# Patient Record
Sex: MALE | Race: WHITE | Employment: FULL TIME | URBAN - METROPOLITAN AREA
[De-identification: names, ages, dates, MRNs, and addresses within clinical notes are randomized per-mention and may not be internally consistent; named-entity substitution may affect disease eponyms.]

---

## 2017-02-13 ENCOUNTER — TELEPHONE (OUTPATIENT)
Dept: FAMILY MEDICINE CLINIC | Age: 52
End: 2017-02-13

## 2017-02-13 NOTE — TELEPHONE ENCOUNTER
Patient called and stated that he needs to have labs done for his diabetes, but none was ordered in chart. Please advise.

## 2017-02-17 ENCOUNTER — OFFICE VISIT (OUTPATIENT)
Dept: FAMILY MEDICINE CLINIC | Age: 52
End: 2017-02-17

## 2017-02-17 VITALS
BODY MASS INDEX: 28.57 KG/M2 | WEIGHT: 177.75 LBS | RESPIRATION RATE: 20 BRPM | HEIGHT: 66 IN | SYSTOLIC BLOOD PRESSURE: 138 MMHG | DIASTOLIC BLOOD PRESSURE: 83 MMHG | HEART RATE: 91 BPM | TEMPERATURE: 97.8 F

## 2017-02-17 DIAGNOSIS — R50.9 FEVER AND CHILLS: ICD-10-CM

## 2017-02-17 DIAGNOSIS — R68.89 FLU-LIKE SYMPTOMS: Primary | ICD-10-CM

## 2017-02-17 DIAGNOSIS — Z51.81 MEDICATION MONITORING ENCOUNTER: ICD-10-CM

## 2017-02-17 DIAGNOSIS — J44.9 CHRONIC OBSTRUCTIVE PULMONARY DISEASE, UNSPECIFIED COPD TYPE (HCC): ICD-10-CM

## 2017-02-17 DIAGNOSIS — E11.9 TYPE 2 DIABETES MELLITUS WITHOUT COMPLICATION, WITHOUT LONG-TERM CURRENT USE OF INSULIN (HCC): ICD-10-CM

## 2017-02-17 DIAGNOSIS — R25.2 MUSCLE CRAMPING: ICD-10-CM

## 2017-02-17 DIAGNOSIS — E55.9 VITAMIN D DEFICIENCY: ICD-10-CM

## 2017-02-17 DIAGNOSIS — R52 BODY ACHES: ICD-10-CM

## 2017-02-17 DIAGNOSIS — R05.9 COUGH: ICD-10-CM

## 2017-02-17 DIAGNOSIS — E78.5 HYPERLIPIDEMIA, UNSPECIFIED HYPERLIPIDEMIA TYPE: ICD-10-CM

## 2017-02-17 LAB
QUICKVUE INFLUENZA TEST: NEGATIVE
VALID INTERNAL CONTROL?: YES

## 2017-02-17 RX ORDER — METHOCARBAMOL 500 MG/1
TABLET, FILM COATED ORAL
Qty: 30 TAB | Refills: 2 | Status: SHIPPED | OUTPATIENT
Start: 2017-02-17 | End: 2017-09-26

## 2017-02-17 RX ORDER — OSELTAMIVIR PHOSPHATE 75 MG/1
75 CAPSULE ORAL 2 TIMES DAILY
Qty: 10 CAP | Refills: 0 | Status: SHIPPED | OUTPATIENT
Start: 2017-02-17 | End: 2017-02-22

## 2017-02-17 NOTE — MR AVS SNAPSHOT
Visit Information Date & Time Provider Department Dept. Phone Encounter #  
 2/17/2017  2:45 PM Demetrius Levy MD [address] [phone] [id number] Follow-up Instructions Return in about 1 month (around 3/17/2017). Upcoming Health Maintenance Date Due  
 EYE EXAM RETINAL OR DILATED Q1 2/9/2016 HEMOGLOBIN A1C Q6M 5/5/2017 FOOT EXAM Q1 5/26/2017 MICROALBUMIN Q1 5/26/2017 FOBT Q 1 YEAR AGE 50-75 5/26/2017 LIPID PANEL Q1 11/5/2017 DTaP/Tdap/Td series (2 - Td) 2/26/2026 Allergies as of 2/17/2017  Review Complete On: 2/17/2017 By: Demetrius Levy MD  
  
 Severity Noted Reaction Type Reactions Lisinopril  12/20/2011    Cough Pravastatin  10/24/2013    Itching Current Immunizations  Never Reviewed Name Date Influenza Vaccine 11/4/2013 Pneumococcal Polysaccharide (PPSV-23) 8/26/2016 Tdap 2/26/2016 Not reviewed this visit You Were Diagnosed With   
  
 Codes Comments Flu-like symptoms    -  Primary ICD-10-CM: R68.89 ICD-9-CM: 780.99 Fever and chills     ICD-10-CM: R50.9 ICD-9-CM: 780.60 Cough     ICD-10-CM: R05 ICD-9-CM: 786.2 Body aches     ICD-10-CM: R52 ICD-9-CM: 780.96 Muscle cramping     ICD-10-CM: R25.2 ICD-9-CM: 729.82 Chronic obstructive pulmonary disease, unspecified COPD type ([facility]     ICD-10-CM: J44.9 ICD-9-CM: 384 Hyperlipidemia, unspecified hyperlipidemia type     ICD-10-CM: E78.5 ICD-9-CM: 272.4 Vitamin D deficiency     ICD-10-CM: E55.9 ICD-9-CM: 268.9 Type 2 diabetes mellitus without complication, without long-term current use of insulin (HCC)     ICD-10-CM: E11.9 ICD-9-CM: 250.00 Medication monitoring encounter     ICD-10-CM: Z51.81 
ICD-9-CM: V58.83 Vitals BP Pulse Temp Resp Height(growth percentile) Weight(growth percentile)  138/83 (BP 1 Location: Right arm, BP Patient Position: Sitting) 91 97.8 °F (36.6 °C) (Oral) 20 5' 6\" (1.676 m) 177 lb 12 oz (80.6 kg) BMI Smoking Status 28.69 kg/m2 Current Every Day Smoker BMI and BSA Data Body Mass Index Body Surface Area  
 28.69 kg/m 2 1.94 m 2 Preferred Pharmacy Pharmacy Name Phone Melany Farrell [address], [address] [address] [phone] Your Updated Medication List  
  
   
This list is accurate as of: 2/17/17  4:12 PM.  Always use your most recent med list.  
  
  
  
  
 albuterol 90 mcg/actuation inhaler Commonly known as:  PROVENTIL HFA, VENTOLIN HFA, PROAIR HFA Take 2 Puffs by inhalation every six (6) hours as needed for Wheezing. aspirin 81 mg chewable tablet Take 81 mg by mouth daily. ergocalciferol 50,000 unit capsule Commonly known as:  VITAMIN D2  
TAKE 1 CAPSULE BY MOUTH EVERY 7 DAYS  
  
 fenofibrate nanocrystallized 145 mg tablet Commonly known as:  TRICOR  
TAKE 1 TABLET BY MOUTH EVERY DAY  
  
 losartan 25 mg tablet Commonly known as:  COZAAR  
TAKE 1 TABLET BY MOUTH EVERY DAY  
  
 methocarbamol 500 mg tablet Commonly known as:  ROBAXIN  
TAKE 1 TABLET BY MOUTH EVERY NIGHT AS NEEDED  
  
 naproxen 500 mg tablet Commonly known as:  NAPROSYN Take 500 mg by mouth two (2) times daily (with meals). oseltamivir 75 mg capsule Commonly known as:  TAMIFLU Take 1 Cap by mouth two (2) times a day for 5 days. SITagliptin-metFORMIN 50-1,000 mg per tablet Commonly known as:  JANUMET  
TAKE 1 TABLET BY MOUTH TWICE DAILY WITH MEALS  
  
 tiotropium bromide 1.25 mcg/actuation inhaler Commonly known as:  Perlita Ka Take 2 Puffs by inhalation daily. ZyrTEC 10 mg tablet Generic drug:  cetirizine Take 10 mg by mouth daily. Prescriptions Sent to Pharmacy Refills  
 methocarbamol (ROBAXIN) 500 mg tablet 2  Sig: TAKE 1 TABLET BY MOUTH EVERY NIGHT AS NEEDED  
 Class: Normal  
 Pharmacy: [facility] [address] [place] [address] Ph #: [phone]  
 oseltamivir (TAMIFLU) 75 mg capsule 0 Sig: Take 1 Cap by mouth two (2) times a day for 5 days. Class: Normal  
 Pharmacy: [facility] [address], [address] [place] [address] Ph #: [phone] Route: Oral  
  
We Performed the Following AMB POC RAPID INFLUENZA TEST [75221 CPT(R)] Follow-up Instructions Return in about 1 month (around 3/17/2017). To-Do List   
 03/17/2017 Lab:  CK   
  
 03/17/2017 Lab:  HEMOGLOBIN A1C W/O EAG   
  
 03/17/2017 Lab:  MAGNESIUM   
  
 03/17/2017 Lab:  METABOLIC PANEL, COMPREHENSIVE   
  
 03/17/2017 Lab:  VITAMIN B12 Patient Instructions Please contact our office if you have any questions about your visit today. Introducing [facility]! Dear Doreen Neri: Thank you for requesting a 6APT account. Our records indicate that you already have an active 6APT account. You can access your account anytime at [email or web address] Did you know that you can access your hospital and ER discharge instructions at any time in 6APT? You can also review all of your test results from your hospital stay or ER visit. Additional Information If you have questions, please visit the Frequently Asked Questions section of the 6APT website at [email or web address] Remember, 6APT is NOT to be used for urgent needs. For medical emergencies, dial 911. Now available from your iPhone and Android! Please provide this summary of care documentation to your next provider. Your primary care clinician is listed as GARDENIA FREED. If you have any questions after today's visit, please call [phone].

## 2017-02-17 NOTE — PROGRESS NOTES
Chief Complaint   Patient presents with    COPD    Diabetes    Cholesterol Problem     high chol    Vitamin D Deficiency    Allergic Rhinitis       Health Maintenance reviewed     1. Have you been to the ER, urgent care clinic since your last visit? Hospitalized since your last visit? No    2. Have you seen or consulted any other health care providers outside of the [address] [place] [address] since your last visit? Include any pap smears or colon screening.  No

## 2017-02-17 NOTE — PROGRESS NOTES
HISTORY OF PRESENT ILLNESS  Melanie Sebastian is a 46 y.o. male. 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 1 Inhaler 6    sitaGLIPtin-metFORMIN (JANUMET) 50-1,000 mg per tablet TAKE 1 TABLET BY MOUTH TWICE DAILY WITH MEALS 60 Tab 6    ergocalciferol (VITAMIN D2) 50,000 unit capsule TAKE 1 CAPSULE BY MOUTH EVERY 7 DAYS 4 Cap 6    fenofibrate nanocrystallized (TRICOR) 145 mg tablet TAKE 1 TABLET BY MOUTH EVERY DAY 30 Tab 0    albuterol (PROVENTIL HFA, VENTOLIN HFA, PROAIR HFA) 90 mcg/actuation inhaler Take 2 Puffs by inhalation every six (6) hours as needed for Wheezing. 1 Inhaler 3    aspirin 81 mg chewable tablet Take 81 mg by mouth daily.  cetirizine (ZYRTEC) 10 mg tablet Take 10 mg by mouth daily.  losartan (COZAAR) 25 mg tablet TAKE 1 TABLET BY MOUTH EVERY DAY 30 Tab 0    naproxen (NAPROSYN) 500 mg tablet Take 500 mg by mouth two (2) times daily (with meals). Allergies   Allergen Reactions    Lisinopril Cough    Pravastatin Itching       Review of Systems   Constitutional: Positive for chills and fever.    HENT: Negative for ear pain and sore throat. Respiratory: Positive for cough and sputum production. Visit Vitals    /83 (BP 1 Location: Right arm, BP Patient Position: Sitting)    Pulse 91    Temp 97.8 °F (36.6 °C) (Oral)    Resp 20    Ht 5' 6\" (1.676 m)    Wt 177 lb 12 oz (80.6 kg)    BMI 28.69 kg/m2       Physical Exam   Constitutional: He appears well-developed and well-nourished. He appears distressed. HENT:   Right Ear: External ear normal.   Left Ear: External ear normal.   Mouth/Throat: Posterior oropharyngeal erythema present. No oropharyngeal exudate. Eyes: Conjunctivae and EOM are normal.   Cardiovascular: Normal rate, regular rhythm and normal heart sounds. Pulmonary/Chest: Effort normal and breath sounds normal. No respiratory distress. He has no wheezes. He has no rales. Lymphadenopathy:     He has cervical adenopathy. Neurological: Coordination normal.   Skin: No pallor. Nursing note and vitals reviewed. Results for orders placed or performed in visit on 02/17/17   AMB POC RAPID INFLUENZA TEST   Result Value Ref Range    VALID INTERNAL CONTROL POC Yes     QuickVue Influenza test Negative Negative    ASSESSMENT and PLAN    ICD-10-CM ICD-9-CM    1. Flu-like symptoms R68.89 780.99 oseltamivir (TAMIFLU) 75 mg capsule   2. Fever and chills R50.9 780.60 AMB POC RAPID INFLUENZA TEST   3. Cough R05 786.2 AMB POC RAPID INFLUENZA TEST   4. Body aches R52 780.96 AMB POC RAPID INFLUENZA TEST      VITAMIN B12   5. Muscle cramping R25.2 729.82 methocarbamol (ROBAXIN) 500 mg tablet      METABOLIC PANEL, COMPREHENSIVE      MAGNESIUM      VITAMIN B12      CK   6. Chronic obstructive pulmonary disease, unspecified COPD type ([facility] J44.9 496    7. Hyperlipidemia, unspecified hyperlipidemia type Z39.5 205.5 METABOLIC PANEL, COMPREHENSIVE   8. Vitamin D deficiency E55.9 268.9    9. Type 2 diabetes mellitus without complication, without long-term current use of insulin ([facility]) E11.9 250.00 HEMOGLOBIN A1C W/O EAG   10. Medication monitoring encounter B89.95 C52.62 METABOLIC PANEL, COMPREHENSIVE      MAGNESIUM      VITAMIN B12      HEMOGLOBIN A1C W/O EAG      CK   ,Follow-up Disposition:  Return in about 1 month (around 3/17/2017).

## 2017-03-17 DIAGNOSIS — E11.9 TYPE 2 DIABETES MELLITUS WITHOUT COMPLICATION, WITHOUT LONG-TERM CURRENT USE OF INSULIN (HCC): ICD-10-CM

## 2017-03-17 DIAGNOSIS — R52 BODY ACHES: ICD-10-CM

## 2017-03-17 DIAGNOSIS — R25.2 MUSCLE CRAMPING: ICD-10-CM

## 2017-03-17 DIAGNOSIS — E78.5 HYPERLIPIDEMIA, UNSPECIFIED HYPERLIPIDEMIA TYPE: ICD-10-CM

## 2017-03-17 DIAGNOSIS — Z51.81 MEDICATION MONITORING ENCOUNTER: ICD-10-CM

## 2017-03-24 ENCOUNTER — OFFICE VISIT (OUTPATIENT)
Dept: FAMILY MEDICINE CLINIC | Age: 52
End: 2017-03-24

## 2017-03-24 VITALS
RESPIRATION RATE: 16 BRPM | WEIGHT: 178.25 LBS | BODY MASS INDEX: 28.65 KG/M2 | SYSTOLIC BLOOD PRESSURE: 130 MMHG | TEMPERATURE: 97.4 F | HEART RATE: 94 BPM | DIASTOLIC BLOOD PRESSURE: 81 MMHG | HEIGHT: 66 IN

## 2017-03-24 DIAGNOSIS — E11.9 TYPE 2 DIABETES MELLITUS WITHOUT COMPLICATION, WITHOUT LONG-TERM CURRENT USE OF INSULIN (HCC): Primary | ICD-10-CM

## 2017-03-24 DIAGNOSIS — E55.9 VITAMIN D DEFICIENCY: ICD-10-CM

## 2017-03-24 DIAGNOSIS — E78.5 HYPERLIPIDEMIA, UNSPECIFIED HYPERLIPIDEMIA TYPE: ICD-10-CM

## 2017-03-24 DIAGNOSIS — Z51.81 MEDICATION MONITORING ENCOUNTER: ICD-10-CM

## 2017-03-24 NOTE — MR AVS SNAPSHOT
Visit Information Date & Time Provider Department Dept. Phone Encounter #  
 3/24/2017  2:45 PM Audrey Figueroa MD [address] [id number] Follow-up Instructions Return in about 3 months (around 6/24/2017). Upcoming Health Maintenance Date Due  
 EYE EXAM RETINAL OR DILATED Q1 2/9/2016 FOOT EXAM Q1 5/26/2017 MICROALBUMIN Q1 5/26/2017 FOBT Q 1 YEAR AGE 50-75 5/26/2017 HEMOGLOBIN A1C Q6M 9/2/2017 LIPID PANEL Q1 11/5/2017 DTaP/Tdap/Td series (2 - Td) 2/26/2026 Allergies as of 3/24/2017  Review Complete On: 3/24/2017 By: Audrey Figueroa MD  
  
 Severity Noted Reaction Type Reactions Lisinopril  12/20/2011    Cough Pravastatin  10/24/2013    Itching Current Immunizations  Reviewed on 3/24/2017 Name Date Influenza Vaccine 11/4/2013 Pneumococcal Polysaccharide (PPSV-23) 8/26/2016 Tdap 2/26/2016 Reviewed by Audrey Figueroa MD on 3/24/2017 at  3:40 PM  
You Were Diagnosed With   
  
 Codes Comments Type 2 diabetes mellitus without complication, without long-term current use of insulin (HCC)    -  Primary ICD-10-CM: E11.9 ICD-9-CM: 250.00 Hyperlipidemia, unspecified hyperlipidemia type     ICD-10-CM: E78.5 ICD-9-CM: 272.4 Vitamin D deficiency     ICD-10-CM: E55.9 ICD-9-CM: 268.9 Medication monitoring encounter     ICD-10-CM: Z51.81 
ICD-9-CM: V58.83 Vitals BP Pulse Temp Resp Height(growth percentile) Weight(growth percentile) 130/81 (BP 1 Location: Right arm, BP Patient Position: Sitting) 94 97.4 °F (36.3 °C) (Oral) 16 5' 6\" (1.676 m) 178 lb 4 oz (80.9 kg) BMI Smoking Status 28.77 kg/m2 Current Every Day Smoker BMI and BSA Data Body Mass Index Body Surface Area 28.77 kg/m 2 1.94 m 2 Preferred Pharmacy Pharmacy Name Phone  [facility] [address] [facility] [address]uc[address] [phone] Your Updated Medication List  
  
   
This list is accurate as of: 3/24/17  3:49 PM.  Always use your most recent med list.  
  
  
  
  
 albuterol 90 mcg/actuation inhaler Commonly known as:  PROVENTIL HFA, VENTOLIN HFA, PROAIR HFA Take 2 Puffs by inhalation every six (6) hours as needed for Wheezing. aspirin 81 mg chewable tablet Take 81 mg by mouth daily. ergocalciferol 50,000 unit capsule Commonly known as:  VITAMIN D2  
TAKE 1 CAPSULE BY MOUTH EVERY 7 DAYS  
  
 fenofibrate nanocrystallized 145 mg tablet Commonly known as:  TRICOR  
TAKE 1 TABLET BY MOUTH EVERY DAY  
  
 losartan 25 mg tablet Commonly known as:  COZAAR  
TAKE 1 TABLET BY MOUTH EVERY DAY  
  
 methocarbamol 500 mg tablet Commonly known as:  ROBAXIN  
TAKE 1 TABLET BY MOUTH EVERY NIGHT AS NEEDED  
  
 naproxen 500 mg tablet Commonly known as:  NAPROSYN Take 500 mg by mouth two (2) times daily (with meals). SITagliptin-metFORMIN 50-1,000 mg per tablet Commonly known as:  JANUMET  
TAKE 1 TABLET BY MOUTH TWICE DAILY WITH MEALS  
  
 tiotropium bromide 1.25 mcg/actuation inhaler Commonly known as:  Robertha Raja Take 2 Puffs by inhalation daily. ZyrTEC 10 mg tablet Generic drug:  cetirizine Take 10 mg by mouth daily. Follow-up Instructions Return in about 3 months (around 6/24/2017). To-Do List   
 06/24/2017 Lab:  CBC WITH AUTOMATED DIFF   
  
 06/24/2017 Lab:  HEMOGLOBIN A1C W/O EAG   
  
 06/24/2017 Lab:  LIPID PANEL   
  
 06/24/2017 Lab:  MAGNESIUM   
  
 06/24/2017 Lab:  METABOLIC PANEL, COMPREHENSIVE   
  
 06/24/2017 Lab:  MICROALBUMIN, UR, RAND W/ MICROALBUMIN/CREA RATIO   
  
 06/24/2017 Lab:  TSH 3RD GENERATION   
  
 06/24/2017 Lab:  VITAMIN B12   
  
 06/24/2017 Lab:  VITAMIN D, 25 HYDROXY Patient Instructions Please contact our office if you have any questions about your visit today. Introducing [address]! Dear Deysi Jeff: Thank you for requesting a Parkt account. Our records indicate that you already have an active Parkt account. You can access your account anytime at [email or web address] Did you know that you can access your hospital and ER discharge instructions at any time in Parkt? You can also review all of your test results from your hospital stay or ER visit. Additional Information If you have questions, please visit the Frequently Asked Questions section of the Parkt website at [email or web address] Remember, Parkt is NOT to be used for urgent needs. For medical emergencies, dial 911. Now available from your iPhone and Android! Please provide this summary of care documentation to your next provider. Your primary care clinician is listed as GARDENIA FREED. If you have any questions after today's visit, please call [phone].

## 2017-03-24 NOTE — PROGRESS NOTES
Chief Complaint   Patient presents with    COPD    Diabetes    Vitamin D Deficiency    Cholesterol Problem     high chol    Allergic Rhinitis    Results     discuss lab results       Health Maintenance reviewed     1. Have you been to the ER, urgent care clinic since your last visit? Hospitalized since your last visit? No    2. Have you seen or consulted any other health care providers outside of the [address] [place] [address] since your last visit? Include any pap smears or colon screening.  No

## 2017-03-24 NOTE — PROGRESS NOTES
HISTORY OF PRESENT ILLNESS  Malu Green is a 46 y.o. male. Chief Complaint   Patient presents with    COPD    Diabetes chronic problem, stable mildly uncontrolled     Vitamin D Deficiency    Cholesterol Problem chronic problem, stable       high chol    Allergic Rhinitis    Results     discuss lab results       HPI  Past Medical History:   Diagnosis Date    Hyperlipidemia     Type II or unspecified type diabetes mellitus without mention of complication, not stated as uncontrolled     Vitamin D deficiency      Current Outpatient Prescriptions   Medication Sig Dispense Refill    methocarbamol (ROBAXIN) 500 mg tablet TAKE 1 TABLET BY MOUTH EVERY NIGHT AS NEEDED 30 Tab 2    tiotropium bromide (SPIRIVA RESPIMAT) 1.25 mcg/actuation inhaler Take 2 Puffs by inhalation daily. 1 Inhaler 6    sitaGLIPtin-metFORMIN (JANUMET) 50-1,000 mg per tablet TAKE 1 TABLET BY MOUTH TWICE DAILY WITH MEALS 60 Tab 6    ergocalciferol (VITAMIN D2) 50,000 unit capsule TAKE 1 CAPSULE BY MOUTH EVERY 7 DAYS 4 Cap 6    losartan (COZAAR) 25 mg tablet TAKE 1 TABLET BY MOUTH EVERY DAY 30 Tab 0    fenofibrate nanocrystallized (TRICOR) 145 mg tablet TAKE 1 TABLET BY MOUTH EVERY DAY 30 Tab 0    naproxen (NAPROSYN) 500 mg tablet Take 500 mg by mouth two (2) times daily (with meals).  albuterol (PROVENTIL HFA, VENTOLIN HFA, PROAIR HFA) 90 mcg/actuation inhaler Take 2 Puffs by inhalation every six (6) hours as needed for Wheezing. 1 Inhaler 3    aspirin 81 mg chewable tablet Take 81 mg by mouth daily.  cetirizine (ZYRTEC) 10 mg tablet Take 10 mg by mouth daily. Allergies   Allergen Reactions    Lisinopril Cough    Pravastatin Itching       ROS Respiratory: Negative for shortness of breath. Cardiovascular: Negative for chest pain. Genitourinary: Negative for frequency. Neurological: Negative for dizziness and headaches.     Visit Vitals    /81 (BP 1 Location: Right arm, BP Patient Position: Sitting)    Pulse 94    Temp 97.4 °F (36.3 °C) (Oral)    Resp 16    Ht 5' 6\" (1.676 m)    Wt 178 lb 4 oz (80.9 kg)    BMI 28.77 kg/m2       Physical Exam Nursing note and vitals reviewed. Constitutional: He is oriented to person, place, and time. He appears well-developed and well-nourished. No distress. HENT:   Mouth/Throat: Oropharynx is clear and moist.   Neck: No JVD present. No thyromegaly present. Cardiovascular: Normal rate, regular rhythm and normal heart sounds. Pulmonary/Chest: Effort normal and breath sounds normal. No respiratory distress. He has no wheezes. He has no rales. Abdominal: Soft. Bowel sounds are normal. He exhibits no distension. There is no tenderness. There is no rebound. Musculoskeletal: He exhibits no edema and no tenderness. Lymphadenopathy:     He has no cervical adenopathy. Neurological: He is alert and oriented to person, place, and time. Skin: No pallor. Psychiatric: He has a normal mood and affect. His behavior is normal.     Component      Latest Ref Rng & Units 3/2/2017   Sodium      136 - 145 mEq/L 135 (L)   Potassium      3.5 - 5.1 mEq/L 4.0   Chloride      98 - 107 mEq/L 100   CO2      21 - 32 mEq/L 25   Glucose      74 - 106 mg/dl 215 (H)   BUN      7 - 25 mg/dl 10   Creatinine      0.6 - 1.3 mg/dl 1.0   GFR est AA       >60   GFR est non-AA       >60   Calcium      8.5 - 10.1 mg/dl 9.1   AST      15 - 37 U/L 10 (L)   ALT      12 - 78 U/L 20   Alk. phosphatase      45 - 117 U/L 62   Bilirubin, total      0.2 - 1.0 mg/dl 0.6   Protein, total      6.4 - 8.2 gm/dl 7.5   Albumin      3.4 - 5.0 gm/dl 3.8   Magnesium      1.6 - 2.6 mg/dl 1.8   Vitamin B12      193 - 986 pg/ml 335   Hemoglobin A1c, (calculated)      4.8 - 6.0 % 7.2 (H)   CK      39 - 308 U/L 120     ASSESSMENT and PLAN    ICD-10-CM ICD-9-CM    1.  Type 2 diabetes mellitus without complication, without long-term current use of insulin (HCC) L71.1 522.91 METABOLIC PANEL, COMPREHENSIVE      HEMOGLOBIN A1C W/O EAG      TSH 3RD GENERATION      MAGNESIUM      VITAMIN B12      MICROALBUMIN, UR, RAND W/ MICROALBUMIN/CREA RATIO      CBC WITH AUTOMATED DIFF   2. Hyperlipidemia, unspecified hyperlipidemia type E78.5 272.4 LIPID PANEL      METABOLIC PANEL, COMPREHENSIVE      TSH 3RD GENERATION      MAGNESIUM      VITAMIN B12      CBC WITH AUTOMATED DIFF   3. Vitamin D deficiency U13.0 433.9 METABOLIC PANEL, COMPREHENSIVE      TSH 3RD GENERATION      MAGNESIUM      VITAMIN D, 25 HYDROXY      VITAMIN B12      CBC WITH AUTOMATED DIFF   4.  Medication monitoring encounter Z51.81 V58.83 LIPID PANEL      METABOLIC PANEL, COMPREHENSIVE      HEMOGLOBIN A1C W/O EAG      TSH 3RD GENERATION      MAGNESIUM      VITAMIN D, 25 HYDROXY      VITAMIN B12      MICROALBUMIN, UR, RAND W/ MICROALBUMIN/CREA RATIO      CBC WITH AUTOMATED DIFF

## 2017-03-25 DIAGNOSIS — E55.9 VITAMIN D DEFICIENCY: ICD-10-CM

## 2017-03-27 DIAGNOSIS — E78.5 HYPERLIPIDEMIA, UNSPECIFIED HYPERLIPIDEMIA TYPE: ICD-10-CM

## 2017-03-27 RX ORDER — ERGOCALCIFEROL 1.25 MG/1
CAPSULE ORAL
Qty: 4 CAP | Refills: 0 | Status: SHIPPED | OUTPATIENT
Start: 2017-03-27 | End: 2017-04-27 | Stop reason: SDUPTHER

## 2017-03-27 RX ORDER — FENOFIBRATE 145 MG/1
TABLET, COATED ORAL
Qty: 30 TAB | Refills: 0 | Status: SHIPPED | OUTPATIENT
Start: 2017-03-27 | End: 2017-04-29 | Stop reason: SDUPTHER

## 2017-04-27 DIAGNOSIS — E55.9 VITAMIN D DEFICIENCY: ICD-10-CM

## 2017-04-27 RX ORDER — ERGOCALCIFEROL 1.25 MG/1
CAPSULE ORAL
Qty: 4 CAP | Refills: 0 | Status: SHIPPED | OUTPATIENT
Start: 2017-04-27 | End: 2017-05-27 | Stop reason: SDUPTHER

## 2017-04-29 DIAGNOSIS — E78.5 HYPERLIPIDEMIA, UNSPECIFIED HYPERLIPIDEMIA TYPE: ICD-10-CM

## 2017-05-01 RX ORDER — FENOFIBRATE 145 MG/1
TABLET, COATED ORAL
Qty: 30 TAB | Refills: 0 | Status: SHIPPED | OUTPATIENT
Start: 2017-05-01 | End: 2017-05-29 | Stop reason: SDUPTHER

## 2017-05-27 DIAGNOSIS — E55.9 VITAMIN D DEFICIENCY: ICD-10-CM

## 2017-05-29 DIAGNOSIS — E78.5 HYPERLIPIDEMIA, UNSPECIFIED HYPERLIPIDEMIA TYPE: ICD-10-CM

## 2017-05-30 RX ORDER — FENOFIBRATE 145 MG/1
TABLET, COATED ORAL
Qty: 30 TAB | Refills: 0 | Status: SHIPPED | OUTPATIENT
Start: 2017-05-30 | End: 2017-06-26 | Stop reason: SDUPTHER

## 2017-05-30 RX ORDER — ERGOCALCIFEROL 1.25 MG/1
CAPSULE ORAL
Qty: 4 CAP | Refills: 0 | Status: SHIPPED | OUTPATIENT
Start: 2017-05-30 | End: 2017-06-26 | Stop reason: SDUPTHER

## 2017-06-24 DIAGNOSIS — Z51.81 MEDICATION MONITORING ENCOUNTER: ICD-10-CM

## 2017-06-24 DIAGNOSIS — E11.9 TYPE 2 DIABETES MELLITUS WITHOUT COMPLICATION, WITHOUT LONG-TERM CURRENT USE OF INSULIN (HCC): ICD-10-CM

## 2017-06-24 DIAGNOSIS — E78.5 HYPERLIPIDEMIA, UNSPECIFIED HYPERLIPIDEMIA TYPE: ICD-10-CM

## 2017-06-24 DIAGNOSIS — E55.9 VITAMIN D DEFICIENCY: ICD-10-CM

## 2017-06-26 ENCOUNTER — OFFICE VISIT (OUTPATIENT)
Dept: FAMILY MEDICINE CLINIC | Age: 52
End: 2017-06-26

## 2017-06-26 VITALS
TEMPERATURE: 98.2 F | RESPIRATION RATE: 20 BRPM | SYSTOLIC BLOOD PRESSURE: 119 MMHG | HEIGHT: 66 IN | WEIGHT: 177.5 LBS | BODY MASS INDEX: 28.53 KG/M2 | DIASTOLIC BLOOD PRESSURE: 78 MMHG | HEART RATE: 100 BPM

## 2017-06-26 DIAGNOSIS — Z51.81 MEDICATION MONITORING ENCOUNTER: ICD-10-CM

## 2017-06-26 DIAGNOSIS — J84.10 PULMONARY INTERSTITIAL FIBROSIS (HCC): ICD-10-CM

## 2017-06-26 DIAGNOSIS — J44.0 CHRONIC OBSTRUCTIVE PULMONARY DISEASE WITH ACUTE LOWER RESPIRATORY INFECTION (HCC): ICD-10-CM

## 2017-06-26 DIAGNOSIS — E78.5 HYPERLIPIDEMIA, UNSPECIFIED HYPERLIPIDEMIA TYPE: ICD-10-CM

## 2017-06-26 DIAGNOSIS — S69.92XS HAND INJURY, LEFT, SEQUELA: ICD-10-CM

## 2017-06-26 DIAGNOSIS — E55.9 VITAMIN D DEFICIENCY: ICD-10-CM

## 2017-06-26 RX ORDER — ERGOCALCIFEROL 1.25 MG/1
CAPSULE ORAL
Qty: 4 CAP | Refills: 6 | Status: SHIPPED | OUTPATIENT
Start: 2017-06-26 | End: 2017-07-05 | Stop reason: SDUPTHER

## 2017-06-26 RX ORDER — FENOFIBRATE 145 MG/1
TABLET, COATED ORAL
Qty: 30 TAB | Refills: 6 | Status: SHIPPED | OUTPATIENT
Start: 2017-06-26 | End: 2018-03-01

## 2017-06-26 NOTE — MR AVS SNAPSHOT
Visit Information Date & Time Provider Department Dept. Phone Encounter #  
 6/26/2017  3:15 PM Sierra Choi MD [address] [phone] [id number] Follow-up Instructions Return in about 3 months (around 9/26/2017). Upcoming Health Maintenance Date Due  
 EYE EXAM RETINAL OR DILATED Q1 2/9/2016 FOOT EXAM Q1 5/26/2017 FOBT Q 1 YEAR AGE 50-75 5/26/2017 INFLUENZA AGE 9 TO ADULT 8/1/2017 HEMOGLOBIN A1C Q6M 12/17/2017 MICROALBUMIN Q1 6/17/2018 LIPID PANEL Q1 6/17/2018 DTaP/Tdap/Td series (2 - Td) 2/26/2026 Allergies as of 6/26/2017  Review Complete On: 6/26/2017 By: Sierra Choi MD  
  
 Severity Noted Reaction Type Reactions Lisinopril  12/20/2011    Cough Pravastatin  10/24/2013    Itching Current Immunizations  Reviewed on 3/24/2017 Name Date Influenza Vaccine 11/4/2013 Pneumococcal Polysaccharide (PPSV-23) 8/26/2016 Tdap 2/26/2016 Not reviewed this visit You Were Diagnosed With   
  
 Codes Comments Uncontrolled diabetes mellitus type 2 without complications, unspecified long term insulin use status ([facility]    -  Primary ICD-10-CM: E11.65 ICD-9-CM: 250.02 Hyperlipidemia, unspecified hyperlipidemia type     ICD-10-CM: E78.5 ICD-9-CM: 272.4 Pulmonary interstitial fibrosis ([facility].)     ICD-10-CM: J84.10 ICD-9-CM: 078 Chronic obstructive pulmonary disease with acute lower respiratory infection (HCC)     ICD-10-CM: J44.0 ICD-9-CM: 496, 519.8 Vitamin D deficiency     ICD-10-CM: E55.9 ICD-9-CM: 268.9 Hand injury, left, sequela     ICD-10-CM: H59.32YY 
ICD-9-CM: 908.9 Medication monitoring encounter     ICD-10-CM: Z51.81 
ICD-9-CM: V58.83 Vitals BP Pulse Temp Resp Height(growth percentile) Weight(growth percentile) 119/78 (BP 1 Location: Right arm, BP Patient Position: Sitting) 100 98.2 °F (36.8 °C) (Oral) 20 5' 6\" (1.676 m) 177 lb 8 oz (80.5 kg) BMI Smoking Status 28.65 kg/m2 Current Every Day Smoker Vitals History BMI and BSA Data Body Mass Index Body Surface Area  
 28.65 kg/m 2 1.94 m 2 Preferred Pharmacy Pharmacy Name Phone Melany Farrell [address], [address] [place] [address] [phone] Your Updated Medication List  
  
   
This list is accurate as of: 6/26/17  4:19 PM.  Always use your most recent med list.  
  
  
  
  
 albuterol 90 mcg/actuation inhaler Commonly known as:  PROVENTIL HFA, VENTOLIN HFA, PROAIR HFA Take 2 Puffs by inhalation every six (6) hours as needed for Wheezing. aspirin 81 mg chewable tablet Take 81 mg by mouth daily. ergocalciferol 50,000 unit capsule Commonly known as:  ERGOCALCIFEROL  
TAKE 1 CAPSULE BY MOUTH EVERY 7 DAYS  
  
 fenofibrate nanocrystallized 145 mg tablet Commonly known as:  TRICOR  
TAKE 1 TABLET BY MOUTH EVERY DAY  
  
 methocarbamol 500 mg tablet Commonly known as:  ROBAXIN  
TAKE 1 TABLET BY MOUTH EVERY NIGHT AS NEEDED SITagliptin-metFORMIN 50-1,000 mg per tablet Commonly known as:  JANUMET  
TAKE 1 TABLET BY MOUTH TWICE DAILY WITH MEALS  
  
 tiotropium bromide 1.25 mcg/actuation inhaler Commonly known as:  Jacqui Spearing Take 2 Puffs by inhalation daily. ZyrTEC 10 mg tablet Generic drug:  cetirizine Take 10 mg by mouth daily. Prescriptions Sent to Pharmacy Refills  
 fenofibrate nanocrystallized (TRICOR) 145 mg tablet 6 Sig: TAKE 1 TABLET BY MOUTH EVERY DAY Class: Normal  
 Pharmacy: [facility] [address], [address] [address] Ph #: [phone]  
 ergocalciferol (ERGOCALCIFEROL) 50,000 unit capsule 6 Sig: TAKE 1 CAPSULE BY MOUTH EVERY 7 DAYS  Class: Normal  
 Pharmacy: [address], [facility] [address] AT [facility] [address] Ph #: [phone]  
 tiotropium bromide (SPIRIVA RESPIMAT) 1.25 mcg/actuation inhaler 6 Sig: Take 2 Puffs by inhalation daily. Class: Normal  
 Pharmacy: [facility] [address] [facility], [address] [place] [address] Ph #: [phone] Route: Inhalation Follow-up Instructions Return in about 3 months (around 9/26/2017). To-Do List   
 09/26/2017 Lab:  HEMOGLOBIN A1C W/O EAG   
  
 09/26/2017 Lab:  METABOLIC PANEL, BASIC Patient Instructions Please contact our office if you have any questions about your visit today. Introducing [facility]! Dear Emely Yo: Thank you for requesting a [employer] account. Our records indicate that you already have an active [employer] account. You can access your account anytime at [email or web address] Did you know that you can access your hospital and ER discharge instructions at any time in [employer]? You can also review all of your test results from your hospital stay or ER visit. Additional Information If you have questions, please visit the Frequently Asked Questions section of the [employer] website at [email or web address] Remember, [employer] is NOT to be used for urgent needs. For medical emergencies, dial 911. Now available from your iPhone and Android! Please provide this summary of care documentation to your next provider. Your primary care clinician is listed as GARDENIA FREED. If you have any questions after today's visit, please call [phone].

## 2017-06-26 NOTE — PROGRESS NOTES
HISTORY OF PRESENT ILLNESS  Estefania Hamilton is a 46 y.o. male. .  Chief Complaint   Patient presents with    Diabetes Chronic problem, uncontrolled increased     Cholesterol Problem Chronic problem, uncontrolled increased lipids Reports compliance with meds      high chol    Vitamin D Deficiency    Results     discuss lab results    injured left hand  severed tendons at work, had surgery done  HPI  Past Medical History:   Diagnosis Date    Hyperlipidemia     Type II or unspecified type diabetes mellitus without mention of complication, not stated as uncontrolled     Vitamin D deficiency      Current Outpatient Prescriptions   Medication Sig Dispense Refill    ergocalciferol (ERGOCALCIFEROL) 50,000 unit capsule TAKE 1 CAPSULE BY MOUTH EVERY 7 DAYS 4 Cap 0    SITagliptin-metFORMIN (JANUMET) 50-1,000 mg per tablet TAKE 1 TABLET BY MOUTH TWICE DAILY WITH MEALS 60 Tab 6    methocarbamol (ROBAXIN) 500 mg tablet TAKE 1 TABLET BY MOUTH EVERY NIGHT AS NEEDED 30 Tab 2    tiotropium bromide (SPIRIVA RESPIMAT) 1.25 mcg/actuation inhaler Take 2 Puffs by inhalation daily. 1 Inhaler 6    fenofibrate nanocrystallized (TRICOR) 145 mg tablet TAKE 1 TABLET BY MOUTH EVERY DAY 30 Tab 0    albuterol (PROVENTIL HFA, VENTOLIN HFA, PROAIR HFA) 90 mcg/actuation inhaler Take 2 Puffs by inhalation every six (6) hours as needed for Wheezing. 1 Inhaler 3    aspirin 81 mg chewable tablet Take 81 mg by mouth daily.  cetirizine (ZYRTEC) 10 mg tablet Take 10 mg by mouth daily. Allergies   Allergen Reactions    Lisinopril Cough    Pravastatin Itching       Review of Systems   Constitutional: Negative for chills and fever. Respiratory: Positive for cough, sputum production and wheezing.       Visit Vitals    /78 (BP 1 Location: Right arm, BP Patient Position: Sitting)    Pulse 100    Temp 98.2 °F (36.8 °C) (Oral)    Resp 20    Ht 5' 6\" (1.676 m)    Wt 177 lb 8 oz (80.5 kg)    BMI 28.65 kg/m2       Physical Exam Constitutional: He appears well-developed and well-nourished. No distress. HENT:   Right Ear: External ear normal.   Left Ear: External ear normal.   Mouth/Throat: Oropharynx is clear and moist.   Neck: No JVD present. Cardiovascular: Normal rate, regular rhythm and normal heart sounds. Pulmonary/Chest: Effort normal. No respiratory distress. He has wheezes (rare with ronchi scattered). Musculoskeletal: He exhibits no edema. Lymphadenopathy:     He has no cervical adenopathy. Neurological: Coordination normal.   Skin: No pallor. Nursing note and vitals reviewed. .  Results for orders placed or performed during the hospital encounter of 06/17/17   LIPID PANEL   Result Value Ref Range    Cholesterol, total 154 140 - 199 mg/dl    HDL Cholesterol 28 (L) 40 - 96 mg/dl    Triglyceride 99 29 - 150 mg/dl    LDL, calculated 106 0 - 130 mg/dl    CHOL/HDL Ratio 5.5 (H) 0.0 - 5.0 Ratio   METABOLIC PANEL, COMPREHENSIVE   Result Value Ref Range    Sodium 136 136 - 145 mEq/L    Potassium 4.5 3.5 - 5.1 mEq/L    Chloride 102 98 - 107 mEq/L    CO2 24 21 - 32 mEq/L    Glucose 160 (H) 74 - 106 mg/dl    BUN 13 7 - 25 mg/dl    Creatinine 0.9 0.6 - 1.3 mg/dl    GFR est AA >60      GFR est non-AA >60      Calcium 9.1 8.5 - 10.1 mg/dl    AST (SGOT) 9 (L) 15 - 37 U/L    ALT (SGPT) 21 12 - 78 U/L    Alk.  phosphatase 62 45 - 117 U/L    Bilirubin, total 0.4 0.2 - 1.0 mg/dl    Protein, total 7.3 6.4 - 8.2 gm/dl    Albumin 3.6 3.4 - 5.0 gm/dl   HEMOGLOBIN A1C W/O EAG   Result Value Ref Range    Hemoglobin A1c 7.6 (H) 4.8 - 6.0 %   TSH 3RD GENERATION   Result Value Ref Range    TSH 0.984 0.358 - 3.740 uIU/mL   MAGNESIUM   Result Value Ref Range    Magnesium 2.1 1.6 - 2.6 mg/dl   VITAMIN D, 25 HYDROXY   Result Value Ref Range    Vitamin D, 25-OH, Total 50.4 30.0 - 100.0 ng/ml   VITAMIN B12   Result Value Ref Range    Vitamin B12 328 193 - 986 pg/ml   MICROALBUMIN, UR, RAND   Result Value Ref Range    Microalbumin,urine random <5.0 0.0 - 29.9 mg/L   CREATININE, UR, RANDOM   Result Value Ref Range    Creatinine, urine random 29.3 (L) 30.0 - 125.0 mg/dl    Microalbumin/Creat ratio (mg/g creat) 17 0 - 30 mg/g   CBC WITH AUTOMATED DIFF   Result Value Ref Range    WBC 7.5 4.0 - 11.0 1000/mm3    RBC 5.19 3.80 - 5.70 M/uL    HGB 14.3 12.4 - 17.2 gm/dl    HCT 43.4 37.0 - 50.0 %    MCV 83.6 80.0 - 98.0 fL    MCH 27.6 23.0 - 34.6 pg    MCHC 32.9 30.0 - 36.0 gm/dl    PLATELET 628 087 - 327 1000/mm3    MPV 11.9 (H) 6.0 - 10.0 fL    RDW-SD 42.8 35.1 - 43.9      NRBC 0 0 - 0      IMMATURE GRANULOCYTES 0.3 0.0 - 3.0 %    NEUTROPHILS 60.5 34 - 64 %    LYMPHOCYTES 32.1 28 - 48 %    MONOCYTES 6.0 1 - 13 %    EOSINOPHILS 0.7 0 - 5 %    BASOPHILS 0.4 0 - 3 %       ASSESSMENT and PLAN    ICD-10-CM ICD-9-CM    1. Uncontrolled diabetes mellitus type 2 without complications, unspecified long term insulin use status ([facility] increased d/w pt addition of farxiga or glucotrol, .elev fbg, pt will try to work on diet more O10.05 568.77 METABOLIC PANEL, BASIC      HEMOGLOBIN A1C W/O EAG   2. Hyperlipidemia, unspecified hyperlipidemia type increased ldl uncontrolled. E78.5 272.4 fenofibrate nanocrystallized (TRICOR) 145 mg tablet   3. Pulmonary interstitial fibrosis (HCC) J84.10 515 tiotropium bromide (SPIRIVA RESPIMAT) 1.25 mcg/actuation inhaler   4. Chronic obstructive pulmonary disease with acute lower respiratory infection (HCC) J44.0 496 tiotropium bromide (SPIRIVA RESPIMAT) 1.25 mcg/actuation inhaler     519.8    5. Vitamin D deficiency E55.9 268.9 ergocalciferol (ERGOCALCIFEROL) 50,000 unit capsule   6. Hand injury, left, sequela S69.92XS 908.9    7. Medication monitoring encounter F47.47 I63.09 METABOLIC PANEL, BASIC      HEMOGLOBIN A1C W/O EAG   fu 7/5/17 for w/i wellness form and . Follow-up Disposition:  Return in about 3 months (around 9/26/2017).

## 2017-06-26 NOTE — PROGRESS NOTES
Chief Complaint   Patient presents with    Diabetes    Cholesterol Problem     high chol    Vitamin D Deficiency    Results     discuss lab results       Health Maintenance Due   Topic Date Due    EYE EXAM RETINAL OR DILATED Q1  02/09/2016    FOOT EXAM Q1  05/26/2017    FOBT Q 1 YEAR AGE 50-75  05/26/2017       Health Maintenance reviewed     1. Have you been to the ER, urgent care clinic since your last visit? Hospitalized since your last visit? Yes When: 6/17 Where: [facility] ER Reason for visit: laceration    2. Have you seen or consulted any other health care providers outside of the [address] [place] [address] since your last visit? Include any pap smears or colon screening.  No

## 2017-06-28 DIAGNOSIS — E55.9 VITAMIN D DEFICIENCY: ICD-10-CM

## 2017-06-28 RX ORDER — ERGOCALCIFEROL 1.25 MG/1
CAPSULE ORAL
Qty: 4 CAP | Refills: 0 | Status: SHIPPED | OUTPATIENT
Start: 2017-06-28 | End: 2017-11-30 | Stop reason: SDUPTHER

## 2017-06-30 DIAGNOSIS — E78.5 HYPERLIPIDEMIA, UNSPECIFIED HYPERLIPIDEMIA TYPE: ICD-10-CM

## 2017-06-30 DIAGNOSIS — J44.0 CHRONIC OBSTRUCTIVE PULMONARY DISEASE WITH ACUTE LOWER RESPIRATORY INFECTION (HCC): ICD-10-CM

## 2017-06-30 DIAGNOSIS — J84.10 PULMONARY INTERSTITIAL FIBROSIS (HCC): ICD-10-CM

## 2017-06-30 RX ORDER — TIOTROPIUM BROMIDE INHALATION SPRAY 1.56 UG/1
SPRAY, METERED RESPIRATORY (INHALATION)
Qty: 1 INHALER | Refills: 0 | Status: SHIPPED | OUTPATIENT
Start: 2017-06-30 | End: 2018-03-01 | Stop reason: SDUPTHER

## 2017-06-30 RX ORDER — FENOFIBRATE 145 MG/1
TABLET, COATED ORAL
Qty: 30 TAB | Refills: 0 | Status: SHIPPED | OUTPATIENT
Start: 2017-06-30 | End: 2017-07-05 | Stop reason: SDUPTHER

## 2017-07-05 ENCOUNTER — OFFICE VISIT (OUTPATIENT)
Dept: FAMILY MEDICINE CLINIC | Age: 52
End: 2017-07-05

## 2017-07-05 VITALS
BODY MASS INDEX: 28.37 KG/M2 | RESPIRATION RATE: 16 BRPM | TEMPERATURE: 98.2 F | HEART RATE: 97 BPM | HEIGHT: 66 IN | DIASTOLIC BLOOD PRESSURE: 82 MMHG | SYSTOLIC BLOOD PRESSURE: 130 MMHG | WEIGHT: 176.5 LBS

## 2017-07-05 DIAGNOSIS — Z00.00 VISIT FOR WELL MAN HEALTH CHECK: Primary | ICD-10-CM

## 2017-07-05 DIAGNOSIS — Z12.5 SCREENING FOR PROSTATE CANCER: ICD-10-CM

## 2017-07-05 DIAGNOSIS — Z12.11 SCREEN FOR COLON CANCER: ICD-10-CM

## 2017-07-05 LAB
HEMOCCULT STL QL: NEGATIVE
VALID INTERNAL CONTROL?: YES

## 2017-07-05 NOTE — PROGRESS NOTES
Subjective:   Dennie Commander is a 46 y.o. male presenting for his annual checkup. ROS:  Feeling well. No dyspnea or chest pain on exertion. No abdominal pain, change in bowel habits, black or bloody stools. No urinary tract or prostatic symptoms. No neurological complaints. Specific concerns today: none. Current Outpatient Prescriptions   Medication Sig Dispense Refill    SPIRIVA RESPIMAT 1.25 mcg/actuation inhaler INHALE 2 PUFFS BY MOUTH DAILY 1 Inhaler 0    ergocalciferol (ERGOCALCIFEROL) 50,000 unit capsule TAKE 1 CAPSULE BY MOUTH EVERY 7 DAYS 4 Cap 0    fenofibrate nanocrystallized (TRICOR) 145 mg tablet TAKE 1 TABLET BY MOUTH EVERY DAY 30 Tab 6    SITagliptin-metFORMIN (JANUMET) 50-1,000 mg per tablet TAKE 1 TABLET BY MOUTH TWICE DAILY WITH MEALS 60 Tab 6    methocarbamol (ROBAXIN) 500 mg tablet TAKE 1 TABLET BY MOUTH EVERY NIGHT AS NEEDED 30 Tab 2    albuterol (PROVENTIL HFA, VENTOLIN HFA, PROAIR HFA) 90 mcg/actuation inhaler Take 2 Puffs by inhalation every six (6) hours as needed for Wheezing. 1 Inhaler 3    aspirin 81 mg chewable tablet Take 81 mg by mouth daily.  cetirizine (ZYRTEC) 10 mg tablet Take 10 mg by mouth daily.        Allergies   Allergen Reactions    Lisinopril Cough    Pravastatin Itching     Past Medical History:   Diagnosis Date    Hyperlipidemia     Type II or unspecified type diabetes mellitus without mention of complication, not stated as uncontrolled     Vitamin D deficiency      Past Surgical History:   Procedure Laterality Date    HX OTHER SURGICAL Right     Right 3rd finger removed     Family History   Problem Relation Age of Onset    Cancer Mother      breast ca    Cancer Father      Social History   Substance Use Topics    Smoking status: Current Every Day Smoker     Packs/day: 1.50     Years: 30.00     Types: Cigarettes    Smokeless tobacco: Never Used    Alcohol use No      Comment: occasional             Objective:     Visit Vitals    /82 (BP 1 Location: Right arm, BP Patient Position: Sitting)    Pulse 97    Temp 98.2 °F (36.8 °C) (Oral)    Resp 16    Ht 5' 6\" (1.676 m)    Wt 176 lb 8 oz (80.1 kg)    BMI 28.49 kg/m2     The patient appears well, alert, oriented x 3, in no distress. ENT normal.  Neck supple. No adenopathy or thyromegaly. RONAK. Lungs are clear, good air entry, no wheezes, rhonchi or rales. S1 and S2 normal, no murmurs, regular rate and rhythm. Abdomen is soft without tenderness, guarding, mass or organomegaly.  exam: no penile lesions or discharge, no testicular masses or tenderness, no hernias, rectum normal, no masses, prostate normal size, symmetric, no nodules or tenderness, guaiac negative brown stool, PROSTATE EXAM: smooth and symmetric without nodules or tenderness. Extremities show no edema, normal peripheral pulses. Neurological is normal without focal findings. Results for orders placed or performed in visit on 07/05/17   AMB POC FECAL BLOOD, OCCULT, QL 1 CARD   Result Value Ref Range    VALID INTERNAL CONTROL POC Yes     Hemoccult (POC) Negative Negative       Assessment/Plan:   healthy adult male  . ICD-10-CM ICD-9-CM    1. Visit for well man health check Z00.00 V70.0 AMB POC FECAL BLOOD, OCCULT, QL 1 CARD   2. Screening for prostate cancer Z12.5 V76.44 PROSTATE SPECIFIC AG (PSA)      PROSTATE SPECIFIC AG (PSA)   3. Screen for colon cancer Z12.11 V76.51 OCCULT BLOOD, IMMUNOASSAY (FIT)      AMB POC FECAL BLOOD, OCCULT, QL 1 CARD   . wellness form completed

## 2017-07-05 NOTE — PROGRESS NOTES
Chief Complaint   Patient presents with    Complete Physical       Health Maintenance Due   Topic Date Due    EYE EXAM RETINAL OR DILATED Q1  02/09/2016    FOOT EXAM Q1  05/26/2017    FOBT Q 1 YEAR AGE 50-75  05/26/2017       Health Maintenance reviewed     1. Have you been to the ER, urgent care clinic since your last visit? Hospitalized since your last visit? No    2. Have you seen or consulted any other health care providers outside of the [address] [place] [address] since your last visit? Include any pap smears or colon screening.  Yes When: 7/17 Where: ortho Reason for visit: ov

## 2017-07-05 NOTE — PATIENT INSTRUCTIONS
Please contact our office if you have any questions about your visit today. Well Visit, Men 48 to 72: Care Instructions  Your Care Instructions  Physical exams can help you stay healthy. Your doctor has checked your overall health and may have suggested ways to take good care of yourself. He or she also may have recommended tests. At home, you can help prevent illness with healthy eating, regular exercise, and other steps. Follow-up care is a key part of your treatment and safety. Be sure to make and go to all appointments, and call your doctor if you are having problems. It's also a good idea to know your test results and keep a list of the medicines you take. How can you care for yourself at home? · Reach and stay at a healthy weight. This will lower your risk for many problems, such as obesity, diabetes, heart disease, and high blood pressure. · Get at least 30 minutes of exercise on most days of the week. Walking is a good choice. You also may want to do other activities, such as running, swimming, cycling, or playing tennis or team sports. · Do not smoke. Smoking can make health problems worse. If you need help quitting, talk to your doctor about stop-smoking programs and medicines. These can increase your chances of quitting for good. · Protect your skin from too much sun. When you're outdoors from 10 a.m. to 4 p.m., stay in the shade or cover up with clothing and a hat with a wide brim. Wear sunglasses that block UV rays. Even when it's cloudy, put broad-spectrum sunscreen (SPF 30 or higher) on any exposed skin. · See a dentist one or two times a year for checkups and to have your teeth cleaned. · Wear a seat belt in the car. · Limit alcohol to 2 drinks a day. Too much alcohol can cause health problems. Follow your doctor's advice about when to have certain tests. These tests can spot problems early. · Cholesterol.  Your doctor will tell you how often to have this done based on your overall health and other things that can increase your risk for heart attack and stroke. · Blood pressure. Have your blood pressure checked during a routine doctor visit. Your doctor will tell you how often to check your blood pressure based on your age, your blood pressure results, and other factors. · Prostate exam. Talk to your doctor about whether you should have a blood test (called a PSA test) for prostate cancer. Experts disagree on whether men should have this test. Some experts recommend that you discuss the benefits and risks of the test with your doctor. · Diabetes. Ask your doctor whether you should have tests for diabetes. · Vision. Some experts recommend that you have yearly exams for glaucoma and other age-related eye problems starting at age 48. · Hearing. Tell your doctor if you notice any change in your hearing. You can have tests to find out how well you hear. · Colon cancer. You should begin tests for colon cancer at age 48. You may have one of several tests. Your doctor will tell you how often to have tests based on your age and risk. Risks include whether you already had a precancerous polyp removed from your colon or whether your parent, brother, sister, or child has had colon cancer. · Heart attack and stroke risk. At least every 4 to 6 years, you should have your risk for heart attack and stroke assessed. Your doctor uses factors such as your age, blood pressure, cholesterol, and whether you smoke or have diabetes to show what your risk for a heart attack or stroke is over the next 10 years. · Abdominal aortic aneurysm. Ask your doctor whether you should have a test to check for an aneurysm. You may need a test if you ever smoked or if your parent, brother, sister, or child has had an aneurysm. When should you call for help? Watch closely for changes in your health, and be sure to contact your doctor if you have any problems or symptoms that concern you. Where can you learn more?   Go to http://mary-giulia.info/. Enter C762 in the search box to learn more about \"Well Visit, Men 48 to 72: Care Instructions. \"  Current as of: July 19, 2016  Content Version: 11.3  © 6064-8551 [employer]. Care instructions adapted under license by [employer] (which disclaims liability or warranty for this information). If you have questions about a medical condition or this instruction, always ask your healthcare professional. [place] [address] any warranty or liability for your use of this information.

## 2017-07-05 NOTE — MR AVS SNAPSHOT
Visit Information Date & Time Provider Department Dept. Phone Encounter #  
 7/5/2017 11:00 AM [facility] [phone] [id number] Your Appointments 9/26/2017  2:45 PM  
Follow Up with MD [facility] (--) Appt Note: 3 month fu  
 Faustino Zheng [id number] [phone]  
  
   
 Donavondarron Zheng [address] Upcoming Health Maintenance Date Due  
 EYE EXAM RETINAL OR DILATED Q1 2/9/2016 FOBT Q 1 YEAR AGE 50-75 5/26/2017 INFLUENZA AGE 9 TO ADULT 8/1/2017 HEMOGLOBIN A1C Q6M 12/17/2017 MICROALBUMIN Q1 6/17/2018 LIPID PANEL Q1 6/17/2018 FOOT EXAM Q1 7/5/2018 DTaP/Tdap/Td series (2 - Td) 2/26/2026 Allergies as of 7/5/2017  Review Complete On: 7/5/2017 By: Payton Mcarthur MD  
  
 Severity Noted Reaction Type Reactions Lisinopril  12/20/2011    Cough Pravastatin  10/24/2013    Itching Current Immunizations  Reviewed on 3/24/2017 Name Date Influenza Vaccine 11/4/2013 Pneumococcal Polysaccharide (PPSV-23) 8/26/2016 Tdap 2/26/2016 Not reviewed this visit You Were Diagnosed With   
  
 Codes Comments Visit for well man health check    -  Primary ICD-10-CM: Z00.00 ICD-9-CM: V70.0 Screening for prostate cancer     ICD-10-CM: Z12.5 ICD-9-CM: V76.44 Screen for colon cancer     ICD-10-CM: Z12.11 ICD-9-CM: V76.51 Vitals BP Pulse Temp Resp Height(growth percentile) Weight(growth percentile) 130/82 (BP 1 Location: Right arm, BP Patient Position: Sitting) 97 98.2 °F (36.8 °C) (Oral) 16 5' 6\" (1.676 m) 176 lb 8 oz (80.1 kg) BMI Smoking Status 28.49 kg/m2 Current Every Day Smoker Vitals History BMI and BSA Data Body Mass Index Body Surface Area  
 28.49 kg/m 2 1.93 m 2 Preferred Pharmacy Pharmacy Name Phone [facility] [address], [address] [address] [phone] Your Updated Medication List  
  
   
This list is accurate as of: 7/5/17 11:55 AM.  Always use your most recent med list.  
  
  
  
  
 albuterol 90 mcg/actuation inhaler Commonly known as:  PROVENTIL HFA, VENTOLIN HFA, PROAIR HFA Take 2 Puffs by inhalation every six (6) hours as needed for Wheezing. aspirin 81 mg chewable tablet Take 81 mg by mouth daily. ergocalciferol 50,000 unit capsule Commonly known as:  ERGOCALCIFEROL  
TAKE 1 CAPSULE BY MOUTH EVERY 7 DAYS  
  
 fenofibrate nanocrystallized 145 mg tablet Commonly known as:  TRICOR  
TAKE 1 TABLET BY MOUTH EVERY DAY  
  
 methocarbamol 500 mg tablet Commonly known as:  ROBAXIN  
TAKE 1 TABLET BY MOUTH EVERY NIGHT AS NEEDED SITagliptin-metFORMIN 50-1,000 mg per tablet Commonly known as:  JANUMET  
TAKE 1 TABLET BY MOUTH TWICE DAILY WITH MEALS  
  
 SPIRIVA RESPIMAT 1.25 mcg/actuation inhaler Generic drug:  tiotropium bromide INHALE 2 PUFFS BY MOUTH DAILY ZyrTEC 10 mg tablet Generic drug:  cetirizine Take 10 mg by mouth daily. We Performed the Following AMB POC FECAL BLOOD, OCCULT, QL 1 CARD [02839 CPT(R)] To-Do List   
 07/05/2017 Lab:  OCCULT BLOOD, IMMUNOASSAY (FIT)   
  
 07/05/2017 Lab:  PROSTATE SPECIFIC AG (PSA) Patient Instructions Please contact our office if you have any questions about your visit today. Well Visit, Men 48 to 72: Care Instructions Your Care Instructions Physical exams can help you stay healthy. Your doctor has checked your overall health and may have suggested ways to take good care of yourself. He or she also may have recommended tests. At home, you can help prevent illness with healthy eating, regular exercise, and other steps. Follow-up care is a key part of your treatment and safety.  Be sure to make and go to all appointments, and call your doctor if you are having problems. It's also a good idea to know your test results and keep a list of the medicines you take. How can you care for yourself at home? · Reach and stay at a healthy weight. This will lower your risk for many problems, such as obesity, diabetes, heart disease, and high blood pressure. · Get at least 30 minutes of exercise on most days of the week. Walking is a good choice. You also may want to do other activities, such as running, swimming, cycling, or playing tennis or team sports. · Do not smoke. Smoking can make health problems worse. If you need help quitting, talk to your doctor about stop-smoking programs and medicines. These can increase your chances of quitting for good. · Protect your skin from too much sun. When you're outdoors from 10 a.m. to 4 p.m., stay in the shade or cover up with clothing and a hat with a wide brim. Wear sunglasses that block UV rays. Even when it's cloudy, put broad-spectrum sunscreen (SPF 30 or higher) on any exposed skin. · See a dentist one or two times a year for checkups and to have your teeth cleaned. · Wear a seat belt in the car. · Limit alcohol to 2 drinks a day. Too much alcohol can cause health problems. Follow your doctor's advice about when to have certain tests. These tests can spot problems early. · Cholesterol. Your doctor will tell you how often to have this done based on your overall health and other things that can increase your risk for heart attack and stroke. · Blood pressure. Have your blood pressure checked during a routine doctor visit. Your doctor will tell you how often to check your blood pressure based on your age, your blood pressure results, and other factors. · Prostate exam. Talk to your doctor about whether you should have a blood test (called a PSA test) for prostate cancer.  Experts disagree on whether men should have this test. Some experts recommend that you discuss the benefits and risks of the test with your doctor. · Diabetes. Ask your doctor whether you should have tests for diabetes. · Vision. Some experts recommend that you have yearly exams for glaucoma and other age-related eye problems starting at age 48. · Hearing. Tell your doctor if you notice any change in your hearing. You can have tests to find out how well you hear. · Colon cancer. You should begin tests for colon cancer at age 48. You may have one of several tests. Your doctor will tell you how often to have tests based on your age and risk. Risks include whether you already had a precancerous polyp removed from your colon or whether your parent, brother, sister, or child has had colon cancer. · Heart attack and stroke risk. At least every 4 to 6 years, you should have your risk for heart attack and stroke assessed. Your doctor uses factors such as your age, blood pressure, cholesterol, and whether you smoke or have diabetes to show what your risk for a heart attack or stroke is over the next 10 years. · Abdominal aortic aneurysm. Ask your doctor whether you should have a test to check for an aneurysm. You may need a test if you ever smoked or if your parent, brother, sister, or child has had an aneurysm. When should you call for help? Watch closely for changes in your health, and be sure to contact your doctor if you have any problems or symptoms that concern you. Where can you learn more? Go to http://mary-giulia.info/. Enter Z962 in the search box to learn more about \"Well Visit, Men 48 to 72: Care Instructions. \" Current as of: July 19, 2016 Content Version: 11.3 © 9229-6990 Healthwise, Incorporated. Care instructions adapted under license by [employer] (which disclaims liability or warranty for this information).  If you have questions about a medical condition or this instruction, always ask your healthcare professional. [address] any warranty or liability for your use of this information. Introducing [facility] & HEALTH SERVICES! Dear Lili Jin: Thank you for requesting a Mediasmart account. Our records indicate that you already have an active Mediasmart account. You can access your account anytime at [email or web address] Did you know that you can access your hospital and ER discharge instructions at any time in Mediasmart? You can also review all of your test results from your hospital stay or ER visit. Additional Information If you have questions, please visit the Frequently Asked Questions section of the Mediasmart website at [email or web address] Remember, Mediasmart is NOT to be used for urgent needs. For medical emergencies, dial 911. Now available from your iPhone and Android! Please provide this summary of care documentation to your next provider. Your primary care clinician is listed as GARDENIA FREED. If you have any questions after today's visit, please call [phone].

## 2017-07-06 LAB — PSA SERPL-MCNC: 0.3 NG/ML

## 2017-07-15 LAB — HEMOCCULT STL QL IA: NEGATIVE

## 2017-07-27 NOTE — TELEPHONE ENCOUNTER
rashawn--  Ruby Phillips he had talked with  a while back.     [facility]

## 2017-08-18 RX ORDER — VARENICLINE TARTRATE 25 MG
KIT ORAL
Qty: 1 DOSE PACK | Refills: 0 | Status: SHIPPED | OUTPATIENT
Start: 2017-08-18 | End: 2017-09-26 | Stop reason: DRUGHIGH

## 2017-09-26 ENCOUNTER — OFFICE VISIT (OUTPATIENT)
Dept: FAMILY MEDICINE CLINIC | Age: 52
End: 2017-09-26

## 2017-09-26 VITALS
SYSTOLIC BLOOD PRESSURE: 125 MMHG | BODY MASS INDEX: 29.13 KG/M2 | HEIGHT: 66 IN | DIASTOLIC BLOOD PRESSURE: 79 MMHG | HEART RATE: 89 BPM | WEIGHT: 181.25 LBS | RESPIRATION RATE: 16 BRPM | TEMPERATURE: 98.5 F

## 2017-09-26 DIAGNOSIS — E11.9 TYPE 2 DIABETES MELLITUS WITHOUT COMPLICATION, WITHOUT LONG-TERM CURRENT USE OF INSULIN (HCC): ICD-10-CM

## 2017-09-26 DIAGNOSIS — Z72.0 TOBACCO ABUSE: ICD-10-CM

## 2017-09-26 DIAGNOSIS — Z51.81 MEDICATION MONITORING ENCOUNTER: ICD-10-CM

## 2017-09-26 DIAGNOSIS — M79.10 MYALGIA: ICD-10-CM

## 2017-09-26 DIAGNOSIS — R25.2 MUSCLE CRAMPING: ICD-10-CM

## 2017-09-26 DIAGNOSIS — E87.1 HYPONATREMIA: Primary | ICD-10-CM

## 2017-09-26 DIAGNOSIS — Z98.890 HISTORY OF HAND SURGERY: ICD-10-CM

## 2017-09-26 RX ORDER — METHOCARBAMOL 500 MG/1
TABLET, FILM COATED ORAL
Qty: 30 TAB | Refills: 2 | Status: CANCELLED | OUTPATIENT
Start: 2017-09-26

## 2017-09-26 RX ORDER — CYCLOBENZAPRINE HCL 10 MG
10 TABLET ORAL
Qty: 30 TAB | Refills: 0 | Status: SHIPPED | OUTPATIENT
Start: 2017-09-26 | End: 2017-10-31 | Stop reason: SDUPTHER

## 2017-09-26 RX ORDER — VARENICLINE TARTRATE 1 MG/1
TABLET, FILM COATED ORAL
Qty: 60 TAB | Refills: 2 | Status: SHIPPED | OUTPATIENT
Start: 2017-09-26 | End: 2017-12-25

## 2017-09-26 NOTE — PROGRESS NOTES
Chief Complaint   Patient presents with    Diabetes    Cholesterol Problem     high chol    COPD    Vitamin D Deficiency    Results     discuss lab results       Health Maintenance Due   Topic Date Due    EYE EXAM RETINAL OR DILATED Q1  02/09/2016    INFLUENZA AGE 9 TO ADULT  08/01/2017       Health Maintenance reviewed     1. Have you been to the ER, urgent care clinic since your last visit? Hospitalized since your last visit? No    2. Have you seen or consulted any other health care providers outside of the [address] [place] [address] since your last visit? Include any pap smears or colon screening.  Yes When: 9/17 Where: hand surgeon Reason for visit: ov

## 2017-09-26 NOTE — MR AVS SNAPSHOT
Visit Information Date & Time Provider Department Dept. Phone Encounter #  
 9/26/2017  2:45 PM Brittney Goel MD [address] [phone] [id number] Follow-up Instructions Return in about 5 weeks (around 10/31/2017). Upcoming Health Maintenance Date Due  
 EYE EXAM RETINAL OR DILATED Q1 2/9/2016 HEMOGLOBIN A1C Q6M 3/16/2018 MICROALBUMIN Q1 6/17/2018 LIPID PANEL Q1 6/17/2018 FOOT EXAM Q1 7/5/2018 FOBT Q 1 YEAR AGE 50-75 7/15/2018 DTaP/Tdap/Td series (2 - Td) 2/26/2026 Allergies as of 9/26/2017  Review Complete On: 7/5/2017 By: Brittney Goel MD  
  
 Severity Noted Reaction Type Reactions Lisinopril  12/20/2011    Cough Pravastatin  10/24/2013    Itching Current Immunizations  Reviewed on 3/24/2017 Name Date Influenza Vaccine 11/4/2013 Pneumococcal Polysaccharide (PPSV-23) 8/26/2016 Tdap 2/26/2016 Not reviewed this visit You Were Diagnosed With   
  
 Codes Comments Hyponatremia    -  Primary ICD-10-CM: E87.1 ICD-9-CM: 276.1 Muscle cramping     ICD-10-CM: R25.2 ICD-9-CM: 729.82 Myalgia     ICD-10-CM: M79.1 ICD-9-CM: 729.1 Type 2 diabetes mellitus without complication, without long-term current use of insulin (HCC)     ICD-10-CM: E11.9 ICD-9-CM: 250.00 History of hand surgery     ICD-10-CM: Z98.890 ICD-9-CM: V15.29 Vitals BP Pulse Temp Resp Height(growth percentile) Weight(growth percentile) 125/79 (BP 1 Location: Right arm, BP Patient Position: Sitting) 89 98.5 °F (36.9 °C) (Oral) 16 5' 6\" (1.676 m) 181 lb 4 oz (82.2 kg) BMI Smoking Status 29.25 kg/m2 Current Every Day Smoker Vitals History BMI and BSA Data Body Mass Index Body Surface Area  
 29.25 kg/m 2 1.96 m 2 Preferred Pharmacy [facility]acy [facility] [address] [place] [address] [address], [address] [place] [address] [phone] Your Updated Medication List  
  
   
This list is accurate as of: 9/26/17  4:26 PM.  Always use your most recent med list.  
  
  
  
  
 albuterol 90 mcg/actuation inhaler Commonly known as:  PROVENTIL HFA, VENTOLIN HFA, PROAIR HFA Take 2 Puffs by inhalation every six (6) hours as needed for Wheezing. aspirin 81 mg chewable tablet Take 81 mg by mouth daily. cyclobenzaprine 10 mg tablet Commonly known as:  FLEXERIL Take 1 Tab by mouth three (3) times daily as needed for Muscle Spasm(s). ergocalciferol 50,000 unit capsule Commonly known as:  ERGOCALCIFEROL  
TAKE 1 CAPSULE BY MOUTH EVERY 7 DAYS  
  
 fenofibrate nanocrystallized 145 mg tablet Commonly known as:  TRICOR  
TAKE 1 TABLET BY MOUTH EVERY DAY SITagliptin-metFORMIN 50-1,000 mg per tablet Commonly known as:  JANUMET  
TAKE 1 TABLET BY MOUTH TWICE DAILY WITH MEALS  
  
 SPIRIVA RESPIMAT 1.25 mcg/actuation inhaler Generic drug:  tiotropium bromide INHALE 2 PUFFS BY MOUTH DAILY  
  
 varenicline 0.5 mg (11)- 1 mg (42) Dspk Commonly known as:  CHANTIX STARTER PENNY Take as directed on package ZyrTEC 10 mg tablet Generic drug:  cetirizine Take 10 mg by mouth daily. Prescriptions Sent to Pharmacy Refills  
 cyclobenzaprine (FLEXERIL) 10 mg tablet 0 Sig: Take 1 Tab by mouth three (3) times daily as needed for Muscle Spasm(s). Class: Normal  
 Pharmacy: [facility] [address] [place] [address] #: [phone] Route: Oral  
  
Follow-up Instructions Return in about 5 weeks (around 10/31/2017). To-Do List   
 09/29/2017 Lab:  CK   
  
 09/29/2017 Lab:  MAGNESIUM   
  
 09/29/2017 Lab:  METABOLIC PANEL, COMPREHENSIVE   
  
 09/29/2017 Lab:  SED RATE (ESR)   
  
 09/29/2017 Lab:  VITAMIN B12 Patient Instructions Please contact our office if you have any questions about your visit today. Introducing [facility] & HEALTH SERVICES! Dear Jesse Smith: Thank you for requesting a SideStep account. Our records indicate that you already have an active SideStep account. You can access your account anytime at [email or web address] Did you know that you can access your hospital and ER discharge instructions at any time in SideStep? You can also review all of your test results from your hospital stay or ER visit. Additional Information If you have questions, please visit the Frequently Asked Questions section of the SideStep website at [email or web address] Remember, SideStep is NOT to be used for urgent needs. For medical emergencies, dial 911. Now available from your iPhone and Android! Please provide this summary of care documentation to your next provider. Your primary care clinician is listed as GARDENIA FREED. If you have any questions after today's visit, please call [phone].

## 2017-09-27 NOTE — PROGRESS NOTES
HISTORY OF PRESENT ILLNESS  Jovanny Gonzales is a 45 y.o. male. Chief Complaint   Patient presents with    Diabetes Chronic problem, uncontrolled Reports compliance with meds      Cholesterol Problem chronic problem, stable tg uncontrolled      high chol    COPD    Vitamin D Deficiency    Results     discuss lab results   complains of ongoing diffuse cramping    HPI  Past Medical History:   Diagnosis Date    Hyperlipidemia     Type II or unspecified type diabetes mellitus without mention of complication, not stated as uncontrolled     Vitamin D deficiency      Current Outpatient Prescriptions   Medication Sig Dispense Refill    cyclobenzaprine (FLEXERIL) 10 mg tablet Take 1 Tab by mouth three (3) times daily as needed for Muscle Spasm(s). 30 Tab 0    varenicline (CHANTIX CONTINUING MONTH BOX) 1 mg tablet Take as directed per package instructions 60 Tab 2    SPIRIVA RESPIMAT 1.25 mcg/actuation inhaler INHALE 2 PUFFS BY MOUTH DAILY 1 Inhaler 0    ergocalciferol (ERGOCALCIFEROL) 50,000 unit capsule TAKE 1 CAPSULE BY MOUTH EVERY 7 DAYS 4 Cap 0    fenofibrate nanocrystallized (TRICOR) 145 mg tablet TAKE 1 TABLET BY MOUTH EVERY DAY 30 Tab 6    SITagliptin-metFORMIN (JANUMET) 50-1,000 mg per tablet TAKE 1 TABLET BY MOUTH TWICE DAILY WITH MEALS 60 Tab 6    albuterol (PROVENTIL HFA, VENTOLIN HFA, PROAIR HFA) 90 mcg/actuation inhaler Take 2 Puffs by inhalation every six (6) hours as needed for Wheezing. 1 Inhaler 3    aspirin 81 mg chewable tablet Take 81 mg by mouth daily.  cetirizine (ZYRTEC) 10 mg tablet Take 10 mg by mouth daily. Allergies   Allergen Reactions    Lisinopril Cough    Pravastatin Itching       Review of Systems   Constitutional: Negative for malaise/fatigue. Musculoskeletal: Positive for joint pain and myalgias. Negative for falls.      Visit Vitals    /79 (BP 1 Location: Right arm, BP Patient Position: Sitting)    Pulse 89    Temp 98.5 °F (36.9 °C) (Oral)    Resp 16  Ht 5' 6\" (1.676 m)    Wt 181 lb 4 oz (82.2 kg)    BMI 29.25 kg/m2       Physical Exam  Nursing note and vitals reviewed. Constitutional: He is oriented to person, place, and time. He appears well-developed and well-nourished. No distress. HENT:   Mouth/Throat: Oropharynx is clear and moist.   Neck: No JVD present. No thyromegaly present. Cardiovascular: Normal rate, regular rhythm and normal heart sounds. Pulmonary/Chest: Effort normal and breath sounds normal. No respiratory distress. He has no wheezes. He has no rales. Abdominal: Soft. Bowel sounds are normal. He exhibits no distension. There is no tenderness. There is no rebound. Musculoskeletal: He exhibits no edema and no tenderness. Lymphadenopathy:     He has no cervical adenopathy. Neurological: He is alert and oriented to person, place, and time. Skin: No pallor. Psychiatric: He has a normal mood and affect. His behavior is normal.   Results for orders placed or performed during the hospital encounter of 95/68/23   METABOLIC PANEL, BASIC   Result Value Ref Range    Sodium 131 (L) 136 - 145 mEq/L    Potassium 4.0 3.5 - 5.1 mEq/L    Chloride 98 98 - 107 mEq/L    CO2 23 21 - 32 mEq/L    Glucose 148 (H) 74 - 106 mg/dl    BUN 15 7 - 25 mg/dl    Creatinine 0.9 0.6 - 1.3 mg/dl    GFR est AA >60      GFR est non-AA >60      Calcium 9.3 8.5 - 10.1 mg/dl   HEMOGLOBIN A1C W/O EAG   Result Value Ref Range    Hemoglobin A1c 7.3 (H) 4.8 - 6.0 %       ASSESSMENT and PLAN    ICD-10-CM ICD-9-CM    1. Hyponatremia O40.7 348.0 METABOLIC PANEL, COMPREHENSIVE      MAGNESIUM      VITAMIN B12   2. Muscle cramping Q79.5 535.49 METABOLIC PANEL, COMPREHENSIVE      MAGNESIUM      VITAMIN B12      CK      SED RATE (ESR)      cyclobenzaprine (FLEXERIL) 10 mg tablet   3. Myalgia C30.9 575.0 METABOLIC PANEL, COMPREHENSIVE      MAGNESIUM      VITAMIN B12      CK      SED RATE (ESR)      cyclobenzaprine (FLEXERIL) 10 mg tablet   4.  Type 2 diabetes mellitus without complication, without long-term current use of insulin (HCC) E11.9 250.00    5. History of hand surgery Z98.890 V15.29    6.  Tobacco abuse Z72.0 305.1 varenicline (CHANTIX CONTINUING MONTH BOX) 1 mg tablet

## 2017-09-29 DIAGNOSIS — E87.1 HYPONATREMIA: ICD-10-CM

## 2017-09-29 DIAGNOSIS — R25.2 MUSCLE CRAMPING: ICD-10-CM

## 2017-09-29 DIAGNOSIS — M79.10 MYALGIA: ICD-10-CM

## 2017-09-30 LAB
A-G RATIO,AGRAT: 1.8 RATIO (ref 1.1–2.6)
ALBUMIN SERPL-MCNC: 4.4 G/DL (ref 3.5–5)
ALP SERPL-CCNC: 52 U/L (ref 25–115)
ALT SERPL-CCNC: 26 U/L (ref 5–40)
ANION GAP SERPL CALC-SCNC: 19 MMOL/L
AST SERPL W P-5'-P-CCNC: 16 U/L (ref 10–37)
BILIRUB SERPL-MCNC: 0.4 MG/DL (ref 0.2–1.2)
BUN SERPL-MCNC: 10 MG/DL (ref 6–22)
CALCIUM SERPL-MCNC: 9.7 MG/DL (ref 8.4–10.4)
CHLORIDE SERPL-SCNC: 91 MMOL/L (ref 98–110)
CK SERPL-CCNC: 169 U/L (ref 30–200)
CO2 SERPL-SCNC: 24 MMOL/L (ref 20–32)
CREAT SERPL-MCNC: 0.9 MG/DL (ref 0.5–1.2)
GFRAA, 66117: >60
GFRNA, 66118: >60
GLOBULIN,GLOB: 2.5 G/DL (ref 2–4)
GLUCOSE SERPL-MCNC: 212 MG/DL (ref 65–99)
MAGNESIUM SERPL-MCNC: 1.8 MG/DL (ref 1.6–2.5)
POTASSIUM SERPL-SCNC: 3.9 MMOL/L (ref 3.5–5.5)
PROT SERPL-MCNC: 6.9 G/DL (ref 6.4–8.3)
SED RATE (ESR): 6 MM/HR (ref 0–20)
SODIUM SERPL-SCNC: 134 MMOL/L (ref 133–145)
VIT B12 SERPL-MCNC: 347 PG/ML (ref 211–911)

## 2017-10-02 RX ORDER — VARENICLINE TARTRATE 0.5 (11)-1
KIT ORAL
Qty: 1 DOSE PACK | Refills: 0 | Status: SHIPPED | OUTPATIENT
Start: 2017-10-02 | End: 2017-11-30 | Stop reason: SDUPTHER

## 2017-10-31 ENCOUNTER — OFFICE VISIT (OUTPATIENT)
Dept: FAMILY MEDICINE CLINIC | Age: 52
End: 2017-10-31

## 2017-10-31 VITALS — DIASTOLIC BLOOD PRESSURE: 91 MMHG | SYSTOLIC BLOOD PRESSURE: 147 MMHG

## 2017-10-31 DIAGNOSIS — M79.10 MYALGIA: Primary | ICD-10-CM

## 2017-10-31 DIAGNOSIS — R25.2 MUSCLE CRAMPING: ICD-10-CM

## 2017-10-31 DIAGNOSIS — M62.838 MUSCLE SPASM: ICD-10-CM

## 2017-10-31 RX ORDER — METHOCARBAMOL 500 MG/1
TABLET, FILM COATED ORAL
Qty: 30 TAB | Refills: 2 | Status: SHIPPED | OUTPATIENT
Start: 2017-10-31 | End: 2018-05-25 | Stop reason: SDUPTHER

## 2017-10-31 NOTE — MR AVS SNAPSHOT
Visit Information Date & Time Provider Department Dept. Phone Encounter #  
 10/31/2017  2:45 PM Fina Reese MD [address] [id number] Follow-up Instructions Return in about 1 month (around 11/30/2017). Upcoming Health Maintenance Date Due  
 EYE EXAM RETINAL OR DILATED Q1 2/9/2016 HEMOGLOBIN A1C Q6M 3/16/2018 MICROALBUMIN Q1 6/17/2018 LIPID PANEL Q1 6/17/2018 FOOT EXAM Q1 7/5/2018 FOBT Q 1 YEAR AGE 50-75 7/15/2018 DTaP/Tdap/Td series (2 - Td) 2/26/2026 Allergies as of 10/31/2017  Review Complete On: 9/27/2017 By: Fina Reese MD  
  
 Severity Noted Reaction Type Reactions Lisinopril  12/20/2011    Cough Pravastatin  10/24/2013    Itching Current Immunizations  Reviewed on 3/24/2017 Name Date Influenza Vaccine 11/4/2013 Pneumococcal Polysaccharide (PPSV-23) 8/26/2016 Tdap 2/26/2016 Not reviewed this visit You Were Diagnosed With   
  
 Codes Comments Myalgia    -  Primary ICD-10-CM: M79.1 ICD-9-CM: 729.1 Muscle spasm     ICD-10-CM: E12.401 ICD-9-CM: 728.85 Vitals BP Smoking Status (!) 147/91 Current Every Day Smoker Preferred Pharmacy Pharmacy Name Phone [facility] [address] [facility], [address] [place] [address] [phone] Your Updated Medication List  
  
   
This list is accurate as of: 10/31/17  5:16 PM.  Always use your most recent med list.  
  
  
  
  
 albuterol 90 mcg/actuation inhaler Commonly known as:  PROVENTIL HFA, VENTOLIN HFA, PROAIR HFA Take 2 Puffs by inhalation every six (6) hours as needed for Wheezing. aspirin 81 mg chewable tablet Take 81 mg by mouth daily. cyclobenzaprine 10 mg tablet Commonly known as:  FLEXERIL Take 1 Tab by mouth three (3) times daily as needed for Muscle Spasm(s). ergocalciferol 50,000 unit capsule Commonly known as:  ERGOCALCIFEROL  
TAKE 1 CAPSULE BY MOUTH EVERY 7 DAYS  
  
 fenofibrate nanocrystallized 145 mg tablet Commonly known as:  TRICOR  
TAKE 1 TABLET BY MOUTH EVERY DAY SITagliptin-metFORMIN 50-1,000 mg per tablet Commonly known as:  JANUMET  
TAKE 1 TABLET BY MOUTH TWICE DAILY WITH MEALS  
  
 SPIRIVA RESPIMAT 1.25 mcg/actuation inhaler Generic drug:  tiotropium bromide INHALE 2 PUFFS BY MOUTH DAILY * varenicline 1 mg tablet Commonly known as:  [address] Take as directed per package instructions * CHANTIX STARTING MONTH BOX 0.5 mg (11)- 1 mg (42) Dspk Generic drug:  varenicline TAKE AS DIRECTED ON PACKAGE ZyrTEC 10 mg tablet Generic drug:  cetirizine Take 10 mg by mouth daily. * Notice: This list has 2 medication(s) that are the same as other medications prescribed for you. Read the directions carefully, and ask your doctor or other care provider to review them with you. We Performed the Following REFERRAL TO RHEUMATOLOGY [XVK00 Custom] Follow-up Instructions Return in about 1 month (around 11/30/2017). Referral Information Referral ID Referred By Referred To  
  
 [id number] Ferdinand FREED [phone], MD   
   [address] [address] [address] [facility], [address] [place] [address] Str. Phone: [phone] Fax: [phone] Visits Status Start Date End Date 1 New Request 10/31/17 10/31/18 If your referral has a status of pending review or denied, additional information will be sent to support the outcome of this decision. Introducing \[facility]\"" & HEALTH SERVICES! Dear Briana Tan: Thank you for requesting a Openbuilds account. Our records indicate that you already have an active Openbuilds account. You can access your account anytime at [email or web address] Did you know that you can access your hospital and ER discharge instructions at any time in ABL Farms? You can also review all of your test results from your hospital stay or ER visit. Additional Information If you have questions, please visit the Frequently Asked Questions section of the A[employer] Farms website at [email or web address] Remember, ABL Farms is NOT to be used for urgent needs. For medical emergencies, dial 911. Now available from your iPhone and Android! Please provide this summary of care documentation to your next provider. Your primary care clinician is listed as GARDENIA FREED. If you have any questions after today's visit, please call [phone].

## 2017-10-31 NOTE — PROGRESS NOTES
HISTORY OF PRESENT ILLNESS  Remy Morton is a 45 y.o. male. complains of cramping all over chest arms , legs ongoing uncontrolled for a year or more perhaps worsening now recently    HPI  Past Medical History:   Diagnosis Date    Hyperlipidemia     Type II or unspecified type diabetes mellitus without mention of complication, not stated as uncontrolled     Vitamin D deficiency      Current Outpatient Prescriptions   Medication Sig Dispense Refill    CHANTIX STARTING MONTH BOX 0.5 mg (11)- 1 mg (42) DsPk TAKE AS DIRECTED ON PACKAGE 1 Dose Pack 0    cyclobenzaprine (FLEXERIL) 10 mg tablet Take 1 Tab by mouth three (3) times daily as needed for Muscle Spasm(s). 30 Tab 0    varenicline (CHANTIX CONTINUING MONTH BOX) 1 mg tablet Take as directed per package instructions 60 Tab 2    SPIRIVA RESPIMAT 1.25 mcg/actuation inhaler INHALE 2 PUFFS BY MOUTH DAILY 1 Inhaler 0    ergocalciferol (ERGOCALCIFEROL) 50,000 unit capsule TAKE 1 CAPSULE BY MOUTH EVERY 7 DAYS 4 Cap 0    fenofibrate nanocrystallized (TRICOR) 145 mg tablet TAKE 1 TABLET BY MOUTH EVERY DAY 30 Tab 6    SITagliptin-metFORMIN (JANUMET) 50-1,000 mg per tablet TAKE 1 TABLET BY MOUTH TWICE DAILY WITH MEALS 60 Tab 6    albuterol (PROVENTIL HFA, VENTOLIN HFA, PROAIR HFA) 90 mcg/actuation inhaler Take 2 Puffs by inhalation every six (6) hours as needed for Wheezing. 1 Inhaler 3    aspirin 81 mg chewable tablet Take 81 mg by mouth daily.  cetirizine (ZYRTEC) 10 mg tablet Take 10 mg by mouth daily. Allergies   Allergen Reactions    Lisinopril Cough    Pravastatin Itching       Review of Systems   Musculoskeletal: Positive for back pain, joint pain and myalgias. Visit Vitals    BP (!) 147/91      Physical Exam Nursing note and vitals reviewed. Constitutional: He is oriented to person, place, and time. He appears well-developed and well-nourished. No distress. HENT:   Mouth/Throat: Oropharynx is clear and moist.   Neck: No JVD present. No thyromegaly present. Cardiovascular: Normal rate, regular rhythm and normal heart sounds. Pulmonary/Chest: Effort normal and breath sounds normal. No respiratory distress. He has no wheezes. He has no rales. Abdominal: Soft. Bowel sounds are normal. He exhibits no distension. There is no tenderness. There is no rebound. Musculoskeletal: He exhibits no edema and no tenderness. Lymphadenopathy:     He has no cervical adenopathy. Neurological: He is alert and oriented to person, place, and time. Skin: No pallor. Psychiatric: He has a normal mood and affect. His behavior is normal.    Results for orders placed or performed in visit on 87/52/09   METABOLIC PANEL, COMPREHENSIVE   Result Value Ref Range    Glucose 212 (H) 65 - 99 mg/dL    BUN 10 6 - 22 mg/dL    Creatinine 0.9 0.5 - 1.2 mg/dL    Sodium 134 133 - 145 mmol/L    Potassium 3.9 3.5 - 5.5 mmol/L    Chloride 91 (L) 98 - 110 mmol/L    CO2 24 20 - 32 mmol/L    AST (SGOT) 16 10 - 37 U/L    ALT (SGPT) 26 5 - 40 U/L    Alk. phosphatase 52 25 - 115 U/L    Bilirubin, total 0.4 0.2 - 1.2 mg/dL    Calcium 9.7 8.4 - 10.4 mg/dL    Protein, total 6.9 6.4 - 8.3 g/dL    Albumin 4.4 3.5 - 5.0 g/dL    A-G Ratio 1.8 1.1 - 2.6 ratio    Globulin 2.5 2.0 - 4.0 g/dL    Anion gap 19.0 mmol/L    GFRAA >60.0 >60.0    GFRNA >60.0 >60.0   MAGNESIUM   Result Value Ref Range    Magnesium 1.8 1.6 - 2.5 mg/dL   VITAMIN B12   Result Value Ref Range    Vitamin B12 347 211 - 911 pg/mL   CK   Result Value Ref Range    Creatine Kinase,Total 169 30 - 200 U/L   SED RATE (ESR)   Result Value Ref Range    Sed rate (ESR) 6 0 - 20 mm/Hr       ASSESSMENT and PLAN    ICD-10-CM ICD-9-CM    1. Myalgia M79.1 729.1 REFERRAL TO RHEUMATOLOGY   2. Muscle spasm M62.838 728.85 REFERRAL TO RHEUMATOLOGY   3. Uncontrolled diabetes mellitus type 2 without complications, unspecified long term insulin use status (HCC) E11.65 250.02 SITagliptin-metFORMIN (JANUMET) 50-1,000 mg per tablet   4.  Muscle cramping R25.2 729.82 methocarbamol (ROBAXIN) 500 mg tablet

## 2017-11-11 DIAGNOSIS — E55.9 VITAMIN D DEFICIENCY: ICD-10-CM

## 2017-11-13 RX ORDER — ERGOCALCIFEROL 1.25 MG/1
CAPSULE ORAL
Qty: 12 CAP | Refills: 6 | Status: SHIPPED | OUTPATIENT
Start: 2017-11-13

## 2017-11-30 ENCOUNTER — OFFICE VISIT (OUTPATIENT)
Dept: FAMILY MEDICINE CLINIC | Age: 52
End: 2017-11-30

## 2017-11-30 VITALS
HEIGHT: 66 IN | TEMPERATURE: 97.9 F | OXYGEN SATURATION: 96 % | HEART RATE: 88 BPM | WEIGHT: 180 LBS | DIASTOLIC BLOOD PRESSURE: 82 MMHG | BODY MASS INDEX: 28.93 KG/M2 | SYSTOLIC BLOOD PRESSURE: 141 MMHG

## 2017-11-30 DIAGNOSIS — Z51.81 MEDICATION MONITORING ENCOUNTER: ICD-10-CM

## 2017-11-30 DIAGNOSIS — M79.10 MYALGIA: Primary | ICD-10-CM

## 2017-11-30 DIAGNOSIS — E78.1 HYPERTRIGLYCERIDEMIA: ICD-10-CM

## 2017-11-30 DIAGNOSIS — E78.5 HYPERLIPIDEMIA, UNSPECIFIED HYPERLIPIDEMIA TYPE: ICD-10-CM

## 2017-11-30 DIAGNOSIS — R25.2 MUSCLE CRAMPING: ICD-10-CM

## 2017-11-30 DIAGNOSIS — E11.9 TYPE 2 DIABETES MELLITUS WITHOUT COMPLICATION, WITHOUT LONG-TERM CURRENT USE OF INSULIN (HCC): ICD-10-CM

## 2017-11-30 RX ORDER — OMEGA-3-ACID ETHYL ESTERS 1 G/1
2 CAPSULE, LIQUID FILLED ORAL 2 TIMES DAILY
Qty: 120 CAP | Refills: 3 | Status: SHIPPED | OUTPATIENT
Start: 2017-11-30 | End: 2022-01-01

## 2017-11-30 NOTE — MR AVS SNAPSHOT
Visit Information Date & Time Provider Department Dept. Phone Encounter #  
 11/30/2017  1:45 PM Geovany Clement MD [address] [phone] [id number] Follow-up Instructions Return in about 2 months (around 1/30/2018). Upcoming Health Maintenance Date Due  
 EYE EXAM RETINAL OR DILATED Q1 2/9/2016 HEMOGLOBIN A1C Q6M 3/16/2018 MICROALBUMIN Q1 6/17/2018 LIPID PANEL Q1 6/17/2018 FOOT EXAM Q1 7/5/2018 FOBT Q 1 YEAR AGE 50-75 7/15/2018 DTaP/Tdap/Td series (2 - Td) 2/26/2026 Allergies as of 11/30/2017  Review Complete On: 11/30/2017 By: Geovany Clement MD  
  
 Severity Noted Reaction Type Reactions Lisinopril  12/20/2011    Cough Pravastatin  10/24/2013    Itching Current Immunizations  Reviewed on 3/24/2017 Name Date Influenza Vaccine 10/19/2017, 11/4/2013 Pneumococcal Polysaccharide (PPSV-23) 8/26/2016 Tdap 2/26/2016 Not reviewed this visit You Were Diagnosed With   
  
 Codes Comments Type 2 diabetes mellitus without complication, without long-term current use of insulin (HCC)    -  Primary ICD-10-CM: E11.9 ICD-9-CM: 250.00 Hypertriglyceridemia     ICD-10-CM: E78.1 ICD-9-CM: 272.1 Hyperlipidemia, unspecified hyperlipidemia type     ICD-10-CM: E78.5 ICD-9-CM: 272.4 Medication monitoring encounter     ICD-10-CM: Z51.81 
ICD-9-CM: V58.83 Vitals BP Pulse Temp Height(growth percentile) Weight(growth percentile) SpO2  
 141/82 88 97.9 °F (36.6 °C) (Oral) 5' 6\" (1.676 m) 180 lb (81.6 kg) 96% BMI Smoking Status 29.05 kg/m2 Current Every Day Smoker BMI and BSA Data Body Mass Index Body Surface Area 29.05 kg/m 2 1.95 m 2 Preferred Pharmacy Pharmacy Name Phone [facility] [address], [address] [address] [phone] Your Updated Medication List  
  
   
 This list is accurate as of: 11/30/17  2:40 PM.  Always use your most recent med list.  
  
  
  
  
 albuterol 90 mcg/actuation inhaler Commonly known as:  PROVENTIL HFA, VENTOLIN HFA, PROAIR HFA Take 2 Puffs by inhalation every six (6) hours as needed for Wheezing. aspirin 81 mg chewable tablet Take 81 mg by mouth daily. ergocalciferol 50,000 unit capsule Commonly known as:  ERGOCALCIFEROL  
TAKE 1 CAPSULE BY MOUTH EVERY 7 DAYS  
  
 fenofibrate nanocrystallized 145 mg tablet Commonly known as:  TRICOR  
TAKE 1 TABLET BY MOUTH EVERY DAY  
  
 methocarbamol 500 mg tablet Commonly known as:  ROBAXIN  
TAKE 1 TABLET BY MOUTH EVERY NIGHT AS NEEDED  
  
 omega-3 acid ethyl esters 1 gram capsule Commonly known as:  Thersa Ashley Take 2 Caps by mouth two (2) times a day. SITagliptin-metFORMIN 50-1,000 mg per tablet Commonly known as:  JANUMET  
TAKE 1 TABLET BY MOUTH TWICE DAILY WITH MEALS  
  
 SPIRIVA RESPIMAT 1.25 mcg/actuation inhaler Generic drug:  tiotropium bromide INHALE 2 PUFFS BY MOUTH DAILY  
  
 varenicline 1 mg tablet Commonly known as:  [address] Take as directed per package instructions ZyrTEC 10 mg tablet Generic drug:  cetirizine Take 10 mg by mouth daily. Prescriptions Sent to Pharmacy Refills  
 omega-3 acid ethyl esters (LOVAZA) 1 gram capsule 3 Sig: Take 2 Caps by mouth two (2) times a day. Class: Normal  
 Pharmacy: [facility] [address] [place] [address] #: [phone] Route: Oral  
  
Follow-up Instructions Return in about 2 months (around 1/30/2018). To-Do List   
 01/30/2018 Lab:  LIPID PANEL   
  
 01/30/2018 Lab:  METABOLIC PANEL, COMPREHENSIVE Patient Instructions Please contact our office if you have any questions about your visit today. Introducing [facility]! Dear Lyubov Abbott: Thank you for requesting a "[employer] account. Our records indicate that you already have an active "[employer] account. You can access your account anytime at [email or web address] Did you know that you can access your hospital and ER discharge instructions at any time in "[employer]? You can also review all of your test results from your hospital stay or ER visit. Additional Information If you have questions, please visit the Frequently Asked Questions section of the "[employer] website at [email or web address] Remember, "[employer] is NOT to be used for urgent needs. For medical emergencies, dial 911. Now available from your iPhone and Android! Please provide this summary of care documentation to your next provider. Your primary care clinician is listed as GARDENIA FREED. If you have any questions after today's visit, please call [phone].

## 2017-11-30 NOTE — PROGRESS NOTES
Chief Complaint   Patient presents with    Diabetes    Spasms     muscle, cramping, states no longer Tricor, cramping is 75% better    Myalgia     will see Rheumatology in January 2018     1. Have you been to the ER, urgent care clinic since your last visit? Hospitalized since your last visit? No    2. Have you seen or consulted any other health care providers outside of the [address] [place] [address] since your last visit? Include any pap smears or colon screening.  No

## 2017-11-30 NOTE — PROGRESS NOTES
HISTORY OF PRESENT ILLNESS  Fritz Berger is a 45 y.o. male. Chief Complaint   Patient presents with    Diabetes chronic problem, stable     Spasms     muscle, cramping, states no longer Tricor, cramping is 75% better, still some but have dissipated with d/c of med    Myalgia Chronic problem, uncontrolled last visit     will see Rheumatology in January 2018       HPI  Past Medical History:   Diagnosis Date    Hyperlipidemia     Type II or unspecified type diabetes mellitus without mention of complication, not stated as uncontrolled     Vitamin D deficiency      Current Outpatient Prescriptions   Medication Sig Dispense Refill    ergocalciferol (ERGOCALCIFEROL) 50,000 unit capsule TAKE 1 CAPSULE BY MOUTH EVERY 7 DAYS 12 Cap 6    SITagliptin-metFORMIN (JANUMET) 50-1,000 mg per tablet TAKE 1 TABLET BY MOUTH TWICE DAILY WITH MEALS 60 Tab 6    methocarbamol (ROBAXIN) 500 mg tablet TAKE 1 TABLET BY MOUTH EVERY NIGHT AS NEEDED 30 Tab 2    varenicline (CHANTIX CONTINUING MONTH BOX) 1 mg tablet Take as directed per package instructions 60 Tab 2    SPIRIVA RESPIMAT 1.25 mcg/actuation inhaler INHALE 2 PUFFS BY MOUTH DAILY 1 Inhaler 0    albuterol (PROVENTIL HFA, VENTOLIN HFA, PROAIR HFA) 90 mcg/actuation inhaler Take 2 Puffs by inhalation every six (6) hours as needed for Wheezing. 1 Inhaler 3    aspirin 81 mg chewable tablet Take 81 mg by mouth daily.  cetirizine (ZYRTEC) 10 mg tablet Take 10 mg by mouth daily.  fenofibrate nanocrystallized (TRICOR) 145 mg tablet TAKE 1 TABLET BY MOUTH EVERY DAY 30 Tab 6     Allergies   Allergen Reactions    Lisinopril Cough    Pravastatin Itching       ROS  Visit Vitals    /82    Pulse 88    Temp 97.9 °F (36.6 °C) (Oral)    Ht 5' 6\" (1.676 m)    Wt 180 lb (81.6 kg)    SpO2 96%    BMI 29.05 kg/m2       Physical Exam Nursing note and vitals reviewed. Constitutional: He is oriented to person, place, and time.  He appears well-developed and well-nourished. No distress. HENT:   Mouth/Throat: Oropharynx is clear and moist.   Neck: No JVD present. No thyromegaly present. Cardiovascular: Normal rate, regular rhythm and normal heart sounds. Pulmonary/Chest: Effort normal and breath sounds normal. No respiratory distress. He has no wheezes. He has no rales. Musculoskeletal: He exhibits no edema and no tenderness. Lymphadenopathy:     He has no cervical adenopathy. Neurological: He is alert and oriented to person, place, and time. Skin: No pallor. Psychiatric: He has a normal mood and affect. His behavior is normal.    Results for orders placed or performed in visit on 73/75/52   METABOLIC PANEL, COMPREHENSIVE   Result Value Ref Range    Glucose 212 (H) 65 - 99 mg/dL    BUN 10 6 - 22 mg/dL    Creatinine 0.9 0.5 - 1.2 mg/dL    Sodium 134 133 - 145 mmol/L    Potassium 3.9 3.5 - 5.5 mmol/L    Chloride 91 (L) 98 - 110 mmol/L    CO2 24 20 - 32 mmol/L    AST (SGOT) 16 10 - 37 U/L    ALT (SGPT) 26 5 - 40 U/L    Alk. phosphatase 52 25 - 115 U/L    Bilirubin, total 0.4 0.2 - 1.2 mg/dL    Calcium 9.7 8.4 - 10.4 mg/dL    Protein, total 6.9 6.4 - 8.3 g/dL    Albumin 4.4 3.5 - 5.0 g/dL    A-G Ratio 1.8 1.1 - 2.6 ratio    Globulin 2.5 2.0 - 4.0 g/dL    Anion gap 19.0 mmol/L    GFRAA >60.0 >60.0    GFRNA >60.0 >60.0   MAGNESIUM   Result Value Ref Range    Magnesium 1.8 1.6 - 2.5 mg/dL   VITAMIN B12   Result Value Ref Range    Vitamin B12 347 211 - 911 pg/mL   CK   Result Value Ref Range    Creatine Kinase,Total 169 30 - 200 U/L   SED RATE (ESR)   Result Value Ref Range    Sed rate (ESR) 6 0 - 20 mm/Hr       ASSESSMENT and PLAN    ICD-10-CM ICD-9-CM    1. Myalgia M79.1 729.1 improved but not resolved, sig improved off tricor, which I believe is largely the culprit, keep appt with rheumatology for january 2. Muscle cramping R25.2 729.82    3.  Type 2 diabetes mellitus without complication, without long-term current use of insulin ([facility]) W79.2 288.31 METABOLIC PANEL, COMPREHENSIVE   4. Hypertriglyceridemia may increase since off tricor E78.1 272.1 LIPID PANEL      METABOLIC PANEL, COMPREHENSIVE      omega-3 acid ethyl esters (LOVAZA) 1 gram capsule   5. Hyperlipidemia, unspecified hyperlipidemia type E78.5 272.4 LIPID PANEL      METABOLIC PANEL, COMPREHENSIVE      omega-3 acid ethyl esters (LOVAZA) 1 gram capsule   6. Medication monitoring encounter Z51.81 V58.83 LIPID PANEL      METABOLIC PANEL, COMPREHENSIVE     Follow-up Disposition:  Return in about 2 months (around 1/30/2018).

## 2017-12-26 ENCOUNTER — TELEPHONE (OUTPATIENT)
Dept: FAMILY MEDICINE CLINIC | Age: 52
End: 2017-12-26

## 2017-12-26 NOTE — TELEPHONE ENCOUNTER
Patient notified his insurance does not cover Lovaza. His triglyceride level has to be 500 mg or over and he would have to have failed at least 90 days of OTC fish oil. Informed him to take OTC fish oil.

## 2018-01-30 DIAGNOSIS — Z51.81 MEDICATION MONITORING ENCOUNTER: ICD-10-CM

## 2018-01-30 DIAGNOSIS — E78.1 HYPERTRIGLYCERIDEMIA: ICD-10-CM

## 2018-01-30 DIAGNOSIS — E11.9 TYPE 2 DIABETES MELLITUS WITHOUT COMPLICATION, WITHOUT LONG-TERM CURRENT USE OF INSULIN (HCC): ICD-10-CM

## 2018-01-30 DIAGNOSIS — E78.5 HYPERLIPIDEMIA, UNSPECIFIED HYPERLIPIDEMIA TYPE: ICD-10-CM

## 2018-02-11 DIAGNOSIS — J44.0 CHRONIC OBSTRUCTIVE PULMONARY DISEASE WITH ACUTE LOWER RESPIRATORY INFECTION (HCC): ICD-10-CM

## 2018-02-11 DIAGNOSIS — J84.10 PULMONARY INTERSTITIAL FIBROSIS (HCC): ICD-10-CM

## 2018-02-15 RX ORDER — TIOTROPIUM BROMIDE INHALATION SPRAY 1.56 UG/1
SPRAY, METERED RESPIRATORY (INHALATION)
Qty: 1 INHALER | Refills: 1 | Status: SHIPPED | OUTPATIENT
Start: 2018-02-15 | End: 2018-03-01 | Stop reason: SDUPTHER

## 2018-03-01 ENCOUNTER — OFFICE VISIT (OUTPATIENT)
Dept: FAMILY MEDICINE CLINIC | Age: 53
End: 2018-03-01

## 2018-03-01 VITALS
BODY MASS INDEX: 28.69 KG/M2 | DIASTOLIC BLOOD PRESSURE: 79 MMHG | RESPIRATION RATE: 20 BRPM | SYSTOLIC BLOOD PRESSURE: 120 MMHG | HEIGHT: 66 IN | HEART RATE: 93 BPM | WEIGHT: 178.5 LBS | TEMPERATURE: 97.7 F

## 2018-03-01 DIAGNOSIS — J01.40 ACUTE NON-RECURRENT PANSINUSITIS: Primary | ICD-10-CM

## 2018-03-01 DIAGNOSIS — E11.9 TYPE 2 DIABETES MELLITUS WITHOUT COMPLICATION, WITHOUT LONG-TERM CURRENT USE OF INSULIN (HCC): ICD-10-CM

## 2018-03-01 DIAGNOSIS — J44.0 CHRONIC OBSTRUCTIVE PULMONARY DISEASE WITH ACUTE LOWER RESPIRATORY INFECTION (HCC): ICD-10-CM

## 2018-03-01 DIAGNOSIS — J84.10 PULMONARY INTERSTITIAL FIBROSIS (HCC): ICD-10-CM

## 2018-03-01 DIAGNOSIS — H66.90 ACUTE OTITIS MEDIA, UNSPECIFIED OTITIS MEDIA TYPE: ICD-10-CM

## 2018-03-01 RX ORDER — AMOXICILLIN AND CLAVULANATE POTASSIUM 875; 125 MG/1; MG/1
1 TABLET, FILM COATED ORAL EVERY 12 HOURS
Qty: 14 TAB | Refills: 0 | Status: SHIPPED | OUTPATIENT
Start: 2018-03-01 | End: 2018-03-08

## 2018-03-01 NOTE — PATIENT INSTRUCTIONS
Please contact our office if you have any questions about your visit today. Ear Infection (Otitis Media): Care Instructions  Your Care Instructions    An ear infection may start with a cold and affect the middle ear (otitis media). It can hurt a lot. Most ear infections clear up on their own in a couple of days. Most often you will not need antibiotics. This is because many ear infections are caused by a virus. Antibiotics don't work against a virus. Regular doses of pain medicines are the best way to reduce your fever and help you feel better. Follow-up care is a key part of your treatment and safety. Be sure to make and go to all appointments, and call your doctor if you are having problems. It's also a good idea to know your test results and keep a list of the medicines you take. How can you care for yourself at home? · Take pain medicines exactly as directed. ¨ If the doctor gave you a prescription medicine for pain, take it as prescribed. ¨ If you are not taking a prescription pain medicine, take an over-the-counter medicine, such as acetaminophen (Tylenol), ibuprofen (Advil, Motrin), or naproxen (Aleve). Read and follow all instructions on the label. ¨ Do not take two or more pain medicines at the same time unless the doctor told you to. Many pain medicines have acetaminophen, which is Tylenol. Too much acetaminophen (Tylenol) can be harmful. · Plan to take a full dose of pain reliever before bedtime. Getting enough sleep will help you get better. · Try a warm, moist washcloth on the ear. It may help relieve pain. · If your doctor prescribed antibiotics, take them as directed. Do not stop taking them just because you feel better. You need to take the full course of antibiotics. When should you call for help? Call your doctor now or seek immediate medical care if:  ? · You have new or increasing ear pain. ? · You have new or increasing pus or blood draining from your ear.    ? · You have a fever with a stiff neck or a severe headache. ? Watch closely for changes in your health, and be sure to contact your doctor if:  ? · You have new or worse symptoms. ? · You are not getting better after taking an antibiotic for 2 days. Where can you learn more? Go to http://mary-giulia.info/. Enter M389 in the search box to learn more about \"Ear Infection (Otitis Media): Care Instructions. \"  Current as of: May 12, 2017  Content Version: 11.4  © 3171-3977 [employer]. Care instructions adapted under license by [employer] (which disclaims liability or warranty for this information). If you have questions about a medical condition or this instruction, always ask your healthcare professional. [address] any warranty or liability for your use of this information. Sinusitis: Care Instructions  Your Care Instructions    Sinusitis is an infection of the lining of the sinus cavities in your head. Sinusitis often follows a cold. It causes pain and pressure in your head and face. In most cases, sinusitis gets better on its own in 1 to 2 weeks. But some mild symptoms may last for several weeks. Sometimes antibiotics are needed. Follow-up care is a key part of your treatment and safety. Be sure to make and go to all appointments, and call your doctor if you are having problems. It's also a good idea to know your test results and keep a list of the medicines you take. How can you care for yourself at home? · Take an over-the-counter pain medicine, such as acetaminophen (Tylenol), ibuprofen (Advil, Motrin), or naproxen (Aleve). Read and follow all instructions on the label. · If the doctor prescribed antibiotics, take them as directed. Do not stop taking them just because you feel better. You need to take the full course of antibiotics. · Be careful when taking over-the-counter cold or flu medicines and Tylenol at the same time.  Many of these medicines have acetaminophen, which is Tylenol. Read the labels to make sure that you are not taking more than the recommended dose. Too much acetaminophen (Tylenol) can be harmful. · Breathe warm, moist air from a steamy shower, a hot bath, or a sink filled with hot water. Avoid cold, dry air. Using a humidifier in your home may help. Follow the directions for cleaning the machine. · Use saline (saltwater) nasal washes to help keep your nasal passages open and wash out mucus and bacteria. You can buy saline nose drops at a grocery store or drugstore. Or you can make your own at home by adding 1 teaspoon of salt and 1 teaspoon of baking soda to 2 cups of distilled water. If you make your own, fill a bulb syringe with the solution, insert the tip into your nostril, and squeeze gently. Zahra Fusi your nose. · Put a hot, wet towel or a warm gel pack on your face 3 or 4 times a day for 5 to 10 minutes each time. · Try a decongestant nasal spray like oxymetazoline (Afrin). Do not use it for more than 3 days in a row. Using it for more than 3 days can make your congestion worse. When should you call for help? Call your doctor now or seek immediate medical care if:  ? · You have new or worse swelling or redness in your face or around your eyes. ? · You have a new or higher fever. ? Watch closely for changes in your health, and be sure to contact your doctor if:  ? · You have new or worse facial pain. ? · The mucus from your nose becomes thicker (like pus) or has new blood in it. ? · You are not getting better as expected. Where can you learn more? Go to http://mary-giulia.info/. Enter L701 in the search box to learn more about \"Sinusitis: Care Instructions. \"  Current as of: May 12, 2017  Content Version: 11.4  © 7512-3802 [employer]. Care instructions adapted under license by [employer] (which disclaims liability or warranty for this information).  If you have questions about a medical condition or this instruction, always ask your healthcare professional. [address] any warranty or liability for your use of this information. Amoxicillin/Clavulanate Potassium (By mouth)   Amoxicillin (p-afu-o-TATE-in), Clavulanate Potassium (MXGC-oc-um-damon ywg-AKV-gb-um)  Treats infections. This medicine is a penicillin antibiotic. Brand Name(s): Augmentin, Augmentin ES-600, Augmentin XR   There may be other brand names for this medicine. When This Medicine Should Not Be Used: This medicine is not right for everyone. Do not use it if you had an allergic reaction to amoxicillin, clavulanate, or a similar antibiotic (penicillin or cephalosporin), or if you had liver problems caused by Augmentin®. How to Use This Medicine:   Liquid, Tablet, Chewable Tablet, Long Acting Tablet  · Your doctor will tell you how much medicine to use. Do not use more than directed. · Take this medicine with a snack or at the beginning of a meal to help prevent nausea. · Chewable tablets: Chew the tablet completely before you swallow it. · Measure the oral liquid medicine with a marked measuring spoon, oral syringe, or medicine cup. Shake the medicine well just before you measure each dose. Rinse the spoon or dropper after each use. · Swallow the extended-release tablet whole. Do not crush, break, or chew it. · Take all of the medicine in your prescription to clear up your infection, even if you feel better after the first few doses. · Missed dose: Take a dose as soon as you remember. If it is almost time for your next dose, wait until then and take a regular dose. Do not take extra medicine to make up for a missed dose. · Tablet, extended-release tablet, chewable tablet: Store at room temperature, away from heat, moisture, and direct light. · Oral liquid: Store in the refrigerator. Do not freeze. · Throw away any unused oral liquid after 10 days.   Drugs and Foods to Avoid: Ask your doctor or pharmacist before using any other medicine, including over-the-counter medicines, vitamins, and herbal products. · Some medicines can affect how this medicine works. Tell your doctor if you are taking a blood thinner (such as warfarin), allopurinol, or probenecid. Warnings While Using This Medicine:   · Tell your doctor if you are pregnant or breastfeeding, or if you have kidney disease, liver disease, or mononucleosis (mono). · Birth control pills may not work as well while you are taking this medicine. Use another form of birth control to prevent pregnancy. · This medicine can cause diarrhea. Call your doctor if the diarrhea becomes severe, does not stop, or is bloody. Do not take any medicine to stop diarrhea until you have talked to your doctor. Diarrhea can occur 2 months or more after you stop taking this medicine. · Tell any doctor or dentist who treats you that you are using this medicine. This medicine may affect certain medical test results. · Call your doctor if your symptoms do not improve or if they get worse. · The chewable tablet and oral liquid contain phenylalanine. Talk to your doctor before you use this medicine if you have phenylketonuria (PKU). · Keep all medicine out of the reach of children. Never share your medicine with anyone.   Possible Side Effects While Using This Medicine:   Call your doctor right away if you notice any of these side effects:  · Allergic reaction: Itching or hives, swelling in your face or hands, swelling or tingling in your mouth or throat, chest tightness, trouble breathing  · Blistering, peeling, red skin rash  · Change in how much or how often you urinate  · Dark urine or pale stools, nausea, vomiting, loss of appetite, stomach pain, yellow eyes or skin  · Diarrhea that may contain blood, stomach cramps  If you notice these less serious side effects, talk with your doctor:   · Diaper rash  · Mild diarrhea, nausea, vomiting  · Tooth discoloration (in children)  If you notice other side effects that you think are caused by this medicine, tell your doctor. Call your doctor for medical advice about side effects. You may report side effects to FDA at 4-304-IGE-3864  © 2017 [address] Information is for End User's use only and may not be sold, redistributed or otherwise used for commercial purposes. The above information is an  only. It is not intended as medical advice for individual conditions or treatments. Talk to your doctor, nurse or pharmacist before following any medical regimen to see if it is safe and effective for you.

## 2018-03-01 NOTE — MR AVS SNAPSHOT
[address] [place] [address] 
 
 
 [address] [facility] [place] [id number] [phone] Patient: Nicci Kamara MRN:  [id number] Visit Information Date & Time Provider Department Dept. Phone Encounter #  
 3/1/2018  3:30 PM Maite Lea NP [address] [phone] Follow-up Instructions Return in about 2 weeks (around 3/15/2018), or if symptoms worsen or fail to improve. Upcoming Health Maintenance Date Due  
 EYE EXAM RETINAL OR DILATED Q1 2/9/2016 HEMOGLOBIN A1C Q6M 3/16/2018 MICROALBUMIN Q1 6/17/2018 FOOT EXAM Q1 7/5/2018 FOBT Q 1 YEAR AGE 50-75 7/15/2018 LIPID PANEL Q1 2/24/2019 DTaP/Tdap/Td series (2 - Td) 2/26/2026 Allergies as of 3/1/2018  Review Complete On: 3/1/2018 By: Maite Lea NP Severity Noted Reaction Type Reactions Lisinopril  12/20/2011    Cough Pravastatin  10/24/2013    Itching Current Immunizations  Reviewed on 3/24/2017 Name Date Influenza Vaccine 10/19/2017, 11/4/2013 Pneumococcal Polysaccharide (PPSV-23) 8/26/2016 Tdap 2/26/2016 Not reviewed this visit You Were Diagnosed With   
  
 Codes Comments Acute non-recurrent pansinusitis    -  Primary ICD-10-CM: J01.40 ICD-9-CM: 461.8 Pulmonary interstitial fibrosis ([facility]     ICD-10-CM: J84.10 ICD-9-CM: 350 Chronic obstructive pulmonary disease with acute lower respiratory infection (HCC)     ICD-10-CM: J44.0 ICD-9-CM: 496, 519.8 Acute otitis media, unspecified otitis media type     ICD-10-CM: H66.90 ICD-9-CM: 382. 9 Type 2 diabetes mellitus without complication, without long-term current use of insulin (HCC)     ICD-10-CM: E11.9 ICD-9-CM: 250.00 Vitals BP Pulse Temp Resp Height(growth percentile) Weight(growth percentile) 120/79 (BP 1 Location: Right arm, BP Patient Position: Sitting) 93 97.7 °F (36.5 °C) (Oral) 20 5' 6\" (1.676 m) 178 lb 8 oz (81 kg) BMI Smoking Status 28.81 kg/m2 Current Every Day Smoker Vitals History BMI and BSA Data Body Mass Index Body Surface Area  
 28.81 kg/m 2 1.94 m 2 Preferred Pharmacy Pharmacy Name Phone Melany Farrell [address], [facility] [place] [address] [phone] Your Updated Medication List  
  
   
This list is accurate as of 3/1/18  4:08 PM.  Always use your most recent med list.  
  
  
  
  
 albuterol 90 mcg/actuation inhaler Commonly known as:  PROVENTIL HFA, VENTOLIN HFA, PROAIR HFA Take 2 Puffs by inhalation every six (6) hours as needed for Wheezing. amoxicillin-clavulanate 875-125 mg per tablet Commonly known as:  AUGMENTIN Take 1 Tab by mouth every twelve (12) hours for 7 days. aspirin 81 mg chewable tablet Take 81 mg by mouth daily. ergocalciferol 50,000 unit capsule Commonly known as:  ERGOCALCIFEROL  
TAKE 1 CAPSULE BY MOUTH EVERY 7 DAYS  
  
 methocarbamol 500 mg tablet Commonly known as:  ROBAXIN  
TAKE 1 TABLET BY MOUTH EVERY NIGHT AS NEEDED  
  
 omega-3 acid ethyl esters 1 gram capsule Commonly known as:  Doc Diggs Take 2 Caps by mouth two (2) times a day. SITagliptin-metFORMIN 50-1,000 mg per tablet Commonly known as:  JANUMET  
TAKE 1 TABLET BY MOUTH TWICE DAILY WITH MEALS  
  
 tiotropium bromide 1.25 mcg/actuation inhaler Commonly known as:  Maribell Cruz INHALE 2 PUFFS BY MOUTH DAILY ZyrTEC 10 mg tablet Generic drug:  cetirizine Take 10 mg by mouth daily. Prescriptions Sent to Pharmacy Refills  
 tiotropium bromide (SPIRIVA RESPIMAT) 1.25 mcg/actuation inhaler 1 Sig: INHALE 2 PUFFS BY MOUTH DAILY Class: Normal  
 Pharmacy: [facility] [address], [facility] [place] [address] Ph #: [phone] amoxicillin-clavulanate (AUGMENTIN) 875-125 mg per tablet 0 Sig: Take 1 Tab by mouth every twelve (12) hours for 7 days. Class: Normal  
 Pharmacy: [facility], [address] [place] [address] #: [phone] Route: Oral  
  
Follow-up Instructions Return in about 2 weeks (around 3/15/2018), or if symptoms worsen or fail to improve. To-Do List   
 03/01/2018 Lab:  HEMOGLOBIN A1C WITH EAG Around 05/30/2018 Lab:  METABOLIC PANEL, COMPREHENSIVE Patient Instructions Please contact our office if you have any questions about your visit today. Ear Infection (Otitis Media): Care Instructions Your Care Instructions An ear infection may start with a cold and affect the middle ear (otitis media). It can hurt a lot. Most ear infections clear up on their own in a couple of days. Most often you will not need antibiotics. This is because many ear infections are caused by a virus. Antibiotics don't work against a virus. Regular doses of pain medicines are the best way to reduce your fever and help you feel better. Follow-up care is a key part of your treatment and safety. Be sure to make and go to all appointments, and call your doctor if you are having problems. It's also a good idea to know your test results and keep a list of the medicines you take. How can you care for yourself at home? · Take pain medicines exactly as directed. ¨ If the doctor gave you a prescription medicine for pain, take it as prescribed. ¨ If you are not taking a prescription pain medicine, take an over-the-counter medicine, such as acetaminophen (Tylenol), ibuprofen (Advil, Motrin), or naproxen (Aleve). Read and follow all instructions on the label. ¨ Do not take two or more pain medicines at the same time unless the doctor told you to.  Many pain medicines have acetaminophen, which is Tylenol. Too much acetaminophen (Tylenol) can be harmful. · Plan to take a full dose of pain reliever before bedtime. Getting enough sleep will help you get better. · Try a warm, moist washcloth on the ear. It may help relieve pain. · If your doctor prescribed antibiotics, take them as directed. Do not stop taking them just because you feel better. You need to take the full course of antibiotics. When should you call for help? Call your doctor now or seek immediate medical care if: 
? · You have new or increasing ear pain. ? · You have new or increasing pus or blood draining from your ear. ? · You have a fever with a stiff neck or a severe headache. ? Watch closely for changes in your health, and be sure to contact your doctor if: 
? · You have new or worse symptoms. ? · You are not getting better after taking an antibiotic for 2 days. Where can you learn more? Go to http://mary-giulia.info/. Enter I217 in the search box to learn more about \"Ear Infection (Otitis Media): Care Instructions. \" Current as of: May 12, 2017 Content Version: 11.4 © 4833-5926 Dynadec. Care instructions adapted under license by Norse (which disclaims liability or warranty for this information). If you have questions about a medical condition or this instruction, always ask your healthcare professional. [address] any warranty or liability for your use of this information. Sinusitis: Care Instructions Your Care Instructions Sinusitis is an infection of the lining of the sinus cavities in your head. Sinusitis often follows a cold. It causes pain and pressure in your head and face. In most cases, sinusitis gets better on its own in 1 to 2 weeks. But some mild symptoms may last for several weeks. Sometimes antibiotics are needed. Follow-up care is a key part of your treatment and safety.  Be sure to make and go to all appointments, and call your doctor if you are having problems. It's also a good idea to know your test results and keep a list of the medicines you take. How can you care for yourself at home? · Take an over-the-counter pain medicine, such as acetaminophen (Tylenol), ibuprofen (Advil, Motrin), or naproxen (Aleve). Read and follow all instructions on the label. · If the doctor prescribed antibiotics, take them as directed. Do not stop taking them just because you feel better. You need to take the full course of antibiotics. · Be careful when taking over-the-counter cold or flu medicines and Tylenol at the same time. Many of these medicines have acetaminophen, which is Tylenol. Read the labels to make sure that you are not taking more than the recommended dose. Too much acetaminophen (Tylenol) can be harmful. · Breathe warm, moist air from a steamy shower, a hot bath, or a sink filled with hot water. Avoid cold, dry air. Using a humidifier in your home may help. Follow the directions for cleaning the machine. · Use saline (saltwater) nasal washes to help keep your nasal passages open and wash out mucus and bacteria. You can buy saline nose drops at a grocery store or drugstore. Or you can make your own at home by adding 1 teaspoon of salt and 1 teaspoon of baking soda to 2 cups of distilled water. If you make your own, fill a bulb syringe with the solution, insert the tip into your nostril, and squeeze gently. Sergio Mary your nose. · Put a hot, wet towel or a warm gel pack on your face 3 or 4 times a day for 5 to 10 minutes each time. · Try a decongestant nasal spray like oxymetazoline (Afrin). Do not use it for more than 3 days in a row. Using it for more than 3 days can make your congestion worse. When should you call for help? Call your doctor now or seek immediate medical care if: 
? · You have new or worse swelling or redness in your face or around your eyes. ? · You have a new or higher fever. ? Watch closely for changes in your health, and be sure to contact your doctor if: 
? · You have new or worse facial pain. ? · The mucus from your nose becomes thicker (like pus) or has new blood in it. ? · You are not getting better as expected. Where can you learn more? Go to http://mary-giulia.info/. Enter U011 in the search box to learn more about \"Sinusitis: Care Instructions. \" Current as of: May 12, 2017 Content Version: 11.4 © 2171-3428 FreeDrive. Care instructions adapted under license by [employer]ee[employer] (which disclaims liability or warranty for this information). If you have questions about a medical condition or this instruction, always ask your healthcare professional. [address] any warranty or liability for your use of this information. Amoxicillin/Clavulanate Potassium (By mouth) Amoxicillin (i-rtb-m-TATE-in), Clavulanate Potassium (GWKJ-gq-lj-damon ory-NNJ-wv-um) Treats infections. This medicine is a penicillin antibiotic. Brand Name(s): Augmentin, Augmentin ES-600, Augmentin XR There may be other brand names for this medicine. When This Medicine Should Not Be Used: This medicine is not right for everyone. Do not use it if you had an allergic reaction to amoxicillin, clavulanate, or a similar antibiotic (penicillin or cephalosporin), or if you had liver problems caused by Augmentin®. How to Use This Medicine:  
Liquid, Tablet, Chewable Tablet, Long Acting Tablet · Your doctor will tell you how much medicine to use. Do not use more than directed. · Take this medicine with a snack or at the beginning of a meal to help prevent nausea. · Chewable tablets: Chew the tablet completely before you swallow it. · Measure the oral liquid medicine with a marked measuring spoon, oral syringe, or medicine cup.  Shake the medicine well just before you measure each dose. Rinse the spoon or dropper after each use. · Swallow the extended-release tablet whole. Do not crush, break, or chew it. · Take all of the medicine in your prescription to clear up your infection, even if you feel better after the first few doses. · Missed dose: Take a dose as soon as you remember. If it is almost time for your next dose, wait until then and take a regular dose. Do not take extra medicine to make up for a missed dose. · Tablet, extended-release tablet, chewable tablet: Store at room temperature, away from heat, moisture, and direct light. · Oral liquid: Store in the refrigerator. Do not freeze. · Throw away any unused oral liquid after 10 days. Drugs and Foods to Avoid: Ask your doctor or pharmacist before using any other medicine, including over-the-counter medicines, vitamins, and herbal products. · Some medicines can affect how this medicine works. Tell your doctor if you are taking a blood thinner (such as warfarin), allopurinol, or probenecid. Warnings While Using This Medicine: · Tell your doctor if you are pregnant or breastfeeding, or if you have kidney disease, liver disease, or mononucleosis (mono). · Birth control pills may not work as well while you are taking this medicine. Use another form of birth control to prevent pregnancy. · This medicine can cause diarrhea. Call your doctor if the diarrhea becomes severe, does not stop, or is bloody. Do not take any medicine to stop diarrhea until you have talked to your doctor. Diarrhea can occur 2 months or more after you stop taking this medicine. · Tell any doctor or dentist who treats you that you are using this medicine. This medicine may affect certain medical test results. · Call your doctor if your symptoms do not improve or if they get worse. · The chewable tablet and oral liquid contain phenylalanine. Talk to your doctor before you use this medicine if you have phenylketonuria (PKU). · Keep all medicine out of the reach of children. Never share your medicine with anyone. Possible Side Effects While Using This Medicine:  
Call your doctor right away if you notice any of these side effects: · Allergic reaction: Itching or hives, swelling in your face or hands, swelling or tingling in your mouth or throat, chest tightness, trouble breathing · Blistering, peeling, red skin rash · Change in how much or how often you urinate · Dark urine or pale stools, nausea, vomiting, loss of appetite, stomach pain, yellow eyes or skin · Diarrhea that may contain blood, stomach cramps If you notice these less serious side effects, talk with your doctor: · Diaper rash · Mild diarrhea, nausea, vomiting · Tooth discoloration (in children) If you notice other side effects that you think are caused by this medicine, tell your doctor. Call your doctor for medical advice about side effects. You may report side effects to FDA at 2-323-FDA-7913 © 2017 [address] Jarad  Information is for End User's use only and may not be sold, redistributed or otherwise used for commercial purposes. The above information is an  only. It is not intended as medical advice for individual conditions or treatments. Talk to your doctor, nurse or pharmacist before following any medical regimen to see if it is safe and effective for you. Introducing [facility]! Dear Triston Garcia: Thank you for requesting a [employer] account. Our records indicate that you already have an active [employer] account. You can access your account anytime at [email or web address] Did you know that you can access your hospital and ER discharge instructions at any time in [employer]? You can also review all of your test results from your hospital stay or ER visit. Additional Information If you have questions, please visit the Frequently Asked Questions section of the MobilePeak website at [email or web address] Remember, MobilePeak is NOT to be used for urgent needs. For medical emergencies, dial 911. Now available from your iPhone and Android! Please provide this summary of care documentation to your next provider. Your primary care clinician is listed as GARDENIA FREED. If you have any questions after today's visit, please call [phone].

## 2018-03-01 NOTE — PROGRESS NOTES
Chief Complaint   Patient presents with    Diabetes    Cholesterol Problem     high chol    COPD    Vitamin D Deficiency    Nail Problem     onychomycosis    Results     discuss lab results       Health Maintenance Due   Topic Date Due    EYE EXAM RETINAL OR DILATED Q1  02/09/2016    HEMOGLOBIN A1C Q6M  03/16/2018       Health Maintenance reviewed     1. Have you been to the ER, urgent care clinic since your last visit? Hospitalized since your last visit? No    2. Have you seen or consulted any other health care providers outside of the [address] [place] [address] since your last visit? Include any pap smears or colon screening.  No

## 2018-03-01 NOTE — PROGRESS NOTES
HPI  Kim Neri is a 45 y.o. male  Chief Complaint   Patient presents with    Diabetes    Cholesterol Problem     high chol    COPD    Vitamin D Deficiency    Nail Problem     onychomycosis    Results     discuss lab results    Ear Pain     happen 1 week ago after blowing his nose     Left dull ear pain that started one week ago after blowing nose. Reports he has sinus pain and drainage. Reports nasal drainage and congestion. Damaris-seltzer allergy sinus   Reports sinus drainage with mild sputum production. Denies chest pain. Reports shortness of breath related to his COPD and smoking. Requesting refill on his inhaler. Denies taking his zyrtec. Requesting lab results. Past Medical History  Past Medical History:   Diagnosis Date    Hyperlipidemia     Type II or unspecified type diabetes mellitus without mention of complication, not stated as uncontrolled     Vitamin D deficiency        Surgical History  Past Surgical History:   Procedure Laterality Date    HX OTHER SURGICAL Right     Right 3rd finger removed        Medications  Current Outpatient Prescriptions   Medication Sig Dispense Refill    tiotropium bromide (SPIRIVA RESPIMAT) 1.25 mcg/actuation inhaler INHALE 2 PUFFS BY MOUTH DAILY 1 Inhaler 1    amoxicillin-clavulanate (AUGMENTIN) 875-125 mg per tablet Take 1 Tab by mouth every twelve (12) hours for 7 days. 14 Tab 0    omega-3 acid ethyl esters (LOVAZA) 1 gram capsule Take 2 Caps by mouth two (2) times a day. 120 Cap 3    ergocalciferol (ERGOCALCIFEROL) 50,000 unit capsule TAKE 1 CAPSULE BY MOUTH EVERY 7 DAYS 12 Cap 6    SITagliptin-metFORMIN (JANUMET) 50-1,000 mg per tablet TAKE 1 TABLET BY MOUTH TWICE DAILY WITH MEALS 60 Tab 6    methocarbamol (ROBAXIN) 500 mg tablet TAKE 1 TABLET BY MOUTH EVERY NIGHT AS NEEDED 30 Tab 2    albuterol (PROVENTIL HFA, VENTOLIN HFA, PROAIR HFA) 90 mcg/actuation inhaler Take 2 Puffs by inhalation every six (6) hours as needed for Wheezing.  1 Inhaler 3  aspirin 81 mg chewable tablet Take 81 mg by mouth daily.  cetirizine (ZYRTEC) 10 mg tablet Take 10 mg by mouth daily. Allergies  Allergies   Allergen Reactions    Lisinopril Cough    Pravastatin Itching       Family History  Family History   Problem Relation Age of Onset    Cancer Mother      breast ca    Cancer Father        Social History  Social History     Social History    Marital status:      Spouse name: N/A    Number of children: N/A    Years of education: N/A     Occupational History    Not on file. Social History Main Topics    Smoking status: Current Every Day Smoker     Packs/day: 1.50     Years: 30.00     Types: Cigarettes    Smokeless tobacco: Never Used    Alcohol use No      Comment: occasional    Drug use: No    Sexual activity: Not on file     Other Topics Concern    Not on file     Social History Narrative       Problem List  Patient Active Problem List   Diagnosis Code    [facility] mellitus) [facility] E11.9    Hyperlipidemia E78.5    Dental caries K02.9    COPD (chronic obstructive pulmonary disease) ([facility]) J44.9    Vitamin D deficiency E55.9    Onychomycosis B35.1    Colon polyps K63.5       Review of Systems  Review of Systems   Constitutional: Negative for malaise/fatigue. HENT: Positive for congestion, ear pain, hearing loss (muffled), sinus pain and sore throat. Negative for tinnitus. Respiratory: Positive for cough. Negative for shortness of breath. Cardiovascular: Negative for chest pain. Gastrointestinal: Negative for abdominal pain, nausea and vomiting. Genitourinary: Negative for dysuria, frequency and urgency. Neurological: Negative for dizziness and loss of consciousness. Endo/Heme/Allergies: Negative for polydipsia.        Vital Signs  Vitals:    03/01/18 1518   BP: 120/79   Pulse: 93   Resp: 20   Temp: 97.7 °F (36.5 °C)   TempSrc: Oral   Weight: 178 lb 8 oz (81 kg)   Height: 5' 6\" (1.676 m)   PainSc:   3   PainLoc: Ear Physical Exam  Physical Exam   Constitutional: He is oriented to person, place, and time. HENT:   Right Ear: Tympanic membrane normal.   Left Ear: Tympanic membrane is erythematous and bulging. A middle ear effusion is present. Nose: Rhinorrhea present. Right sinus exhibits maxillary sinus tenderness and frontal sinus tenderness. Left sinus exhibits maxillary sinus tenderness and frontal sinus tenderness. Mouth/Throat: Oropharyngeal exudate (clear drainage) present. No posterior oropharyngeal edema. Cardiovascular: Normal rate, regular rhythm and normal heart sounds. No murmur heard. Pulmonary/Chest: Effort normal and breath sounds normal. No respiratory distress. He has no wheezes. Neurological: He is alert and oriented to person, place, and time. Vitals reviewed. Diagnostics  Orders Placed This Encounter    HEMOGLOBIN A1C WITH EAG     Standing Status:   Future     Number of Occurrences:   1     Standing Expiration Date:   3/7/1662    METABOLIC PANEL, COMPREHENSIVE     Standing Status:   Future     Standing Expiration Date:   8/29/2018    tiotropium bromide (SPIRIVA RESPIMAT) 1.25 mcg/actuation inhaler     Sig: INHALE 2 PUFFS BY MOUTH DAILY     Dispense:  1 Inhaler     Refill:  1    amoxicillin-clavulanate (AUGMENTIN) 875-125 mg per tablet     Sig: Take 1 Tab by mouth every twelve (12) hours for 7 days.      Dispense:  14 Tab     Refill:  0       Results  Results for orders placed or performed during the hospital encounter of 02/24/18   LIPID PANEL   Result Value Ref Range    Cholesterol, total 175 140 - 199 mg/dl    HDL Cholesterol 31 (L) 40 - 96 mg/dl    Triglyceride 178 (H) 29 - 150 mg/dl    LDL, calculated 108 0 - 130 mg/dl    CHOL/HDL Ratio 5.6 (H) 0.0 - 5.0 Ratio   METABOLIC PANEL, COMPREHENSIVE   Result Value Ref Range    Sodium 134 (L) 136 - 145 mEq/L    Potassium 4.5 3.5 - 5.1 mEq/L    Chloride 101 98 - 107 mEq/L    CO2 25 21 - 32 mEq/L    Glucose 131 (H) 74 - 106 mg/dl    BUN 11 7 - 25 mg/dl    Creatinine 0.7 0.6 - 1.3 mg/dl    GFR est AA >60      GFR est non-AA >60      Calcium 9.5 8.5 - 10.1 mg/dl    AST (SGOT) 13 (L) 15 - 37 U/L    ALT (SGPT) 35 12 - 78 U/L    Alk. phosphatase 61 45 - 117 U/L    Bilirubin, total 0.7 0.2 - 1.0 mg/dl    Protein, total 7.5 6.4 - 8.2 gm/dl    Albumin 3.8 3.4 - 5.0 gm/dl         Assessment and Plan  Diagnoses and all orders for this visit:    1. Acute non-recurrent pansinusitis  -     amoxicillin-clavulanate (AUGMENTIN) 875-125 mg per tablet; Take 1 Tab by mouth every twelve (12) hours for 7 days. 2. Pulmonary interstitial fibrosis (HCC)  -     tiotropium bromide (SPIRIVA RESPIMAT) 1.25 mcg/actuation inhaler; INHALE 2 PUFFS BY MOUTH DAILY    3. Chronic obstructive pulmonary disease with acute lower respiratory infection (HCC)  -     tiotropium bromide (SPIRIVA RESPIMAT) 1.25 mcg/actuation inhaler; INHALE 2 PUFFS BY MOUTH DAILY    4. Acute otitis media, unspecified otitis media type  -     amoxicillin-clavulanate (AUGMENTIN) 875-125 mg per tablet; Take 1 Tab by mouth every twelve (12) hours for 7 days. 5. Type 2 diabetes mellitus without complication, without long-term current use of insulin (HCC)  -     HEMOGLOBIN A1C WITH EAG; Future  -     METABOLIC PANEL, COMPREHENSIVE; Future    Lab results reviewed with patient. Medication, side effects, possible allergic reactions and warnings reviewed with patient. Patient verbalized understanding. Patient should take Zyrtec as prescribed. After care summary printed and reviewed with patient. Plan reviewed with patient. Questions answered. Patient verbalized understanding of plan and is in agreement with plan. Patient to follow up in two weeks or earlier if symptoms worsen.      CHEIKH Key

## 2018-03-15 ENCOUNTER — OFFICE VISIT (OUTPATIENT)
Dept: FAMILY MEDICINE CLINIC | Age: 53
End: 2018-03-15

## 2018-03-15 VITALS
TEMPERATURE: 97.2 F | DIASTOLIC BLOOD PRESSURE: 79 MMHG | RESPIRATION RATE: 20 BRPM | SYSTOLIC BLOOD PRESSURE: 131 MMHG | BODY MASS INDEX: 28.53 KG/M2 | HEIGHT: 66 IN | WEIGHT: 177.5 LBS | HEART RATE: 98 BPM

## 2018-03-15 DIAGNOSIS — J01.40 ACUTE NON-RECURRENT PANSINUSITIS: ICD-10-CM

## 2018-03-15 DIAGNOSIS — H66.90 ACUTE OTITIS MEDIA, UNSPECIFIED OTITIS MEDIA TYPE: Primary | ICD-10-CM

## 2018-03-15 NOTE — PROGRESS NOTES
PHOEBE Zaidi is a 45 y.o. male  Chief Complaint   Patient presents with    Follow-up     2 week f/u    Pulmonary Fibrosis    COPD    Ear Pain    Diabetes     Reports he completed his antibiotic. Denies ear pain. Denies sinus pain or pressure. Reports all issues have resolved. Past Medical History  Past Medical History:   Diagnosis Date    Hyperlipidemia     Type II or unspecified type diabetes mellitus without mention of complication, not stated as uncontrolled     Vitamin D deficiency        Surgical History  Past Surgical History:   Procedure Laterality Date    HX OTHER SURGICAL Right     Right 3rd finger removed        Medications  Current Outpatient Prescriptions   Medication Sig Dispense Refill    tiotropium bromide (SPIRIVA RESPIMAT) 1.25 mcg/actuation inhaler INHALE 2 PUFFS BY MOUTH DAILY 1 Inhaler 1    omega-3 acid ethyl esters (LOVAZA) 1 gram capsule Take 2 Caps by mouth two (2) times a day. 120 Cap 3    ergocalciferol (ERGOCALCIFEROL) 50,000 unit capsule TAKE 1 CAPSULE BY MOUTH EVERY 7 DAYS 12 Cap 6    SITagliptin-metFORMIN (JANUMET) 50-1,000 mg per tablet TAKE 1 TABLET BY MOUTH TWICE DAILY WITH MEALS 60 Tab 6    methocarbamol (ROBAXIN) 500 mg tablet TAKE 1 TABLET BY MOUTH EVERY NIGHT AS NEEDED 30 Tab 2    albuterol (PROVENTIL HFA, VENTOLIN HFA, PROAIR HFA) 90 mcg/actuation inhaler Take 2 Puffs by inhalation every six (6) hours as needed for Wheezing. 1 Inhaler 3    aspirin 81 mg chewable tablet Take 81 mg by mouth daily.  cetirizine (ZYRTEC) 10 mg tablet Take 10 mg by mouth daily.          Allergies  Allergies   Allergen Reactions    Lisinopril Cough    Pravastatin Itching       Family History  Family History   Problem Relation Age of Onset    Cancer Mother      breast ca    Cancer Father        Social History  Social History     Social History    Marital status:      Spouse name: N/A    Number of children: N/A    Years of education: N/A     Occupational History    Not on file. Social History Main Topics    Smoking status: Current Every Day Smoker     Packs/day: 1.50     Years: 30.00     Types: Cigarettes    Smokeless tobacco: Never Used    Alcohol use No      Comment: occasional    Drug use: No    Sexual activity: Not on file     Other Topics Concern    Not on file     Social History Narrative       Problem List  Patient Active Problem List   Diagnosis Code    DM (diabetes mellitus) ([facility] E11.9    Hyperlipidemia E78.5    Dental caries K02.9    COPD (chronic obstructive pulmonary disease) ([facility]) J44.9    Vitamin D deficiency E55.9    Onychomycosis B35.1    Colon polyps K63.5       Review of Systems  Review of Systems   Constitutional: Negative for chills and fever. HENT: Negative for congestion, ear pain, sinus pain and sore throat. Respiratory: Negative for cough, sputum production and shortness of breath. Vital Signs  Vitals:    03/15/18 1507   BP: 131/79   Pulse: 98   Resp: 20   Temp: 97.2 °F (36.2 °C)   TempSrc: Oral   Weight: 177 lb 8 oz (80.5 kg)   Height: 5' 6\" (1.676 m)   PainSc:   0 - No pain       Physical Exam  Physical Exam   Constitutional: He is oriented to person, place, and time. HENT:   Right Ear: Tympanic membrane normal.   Left Ear: Tympanic membrane normal.   Nose: Nose normal. No mucosal edema or rhinorrhea. Right sinus exhibits no maxillary sinus tenderness and no frontal sinus tenderness. Left sinus exhibits no maxillary sinus tenderness and no frontal sinus tenderness. Mouth/Throat: Oropharynx is clear and moist.   Cardiovascular: Normal rate, regular rhythm and normal heart sounds. Pulmonary/Chest: Effort normal and breath sounds normal. No respiratory distress. He has no wheezes. Neurological: He is alert and oriented to person, place, and time. Diagnostics  No orders of the defined types were placed in this encounter.       Results  Results for orders placed or performed during the hospital encounter of 02/24/18   LIPID PANEL   Result Value Ref Range    Cholesterol, total 175 140 - 199 mg/dl    HDL Cholesterol 31 (L) 40 - 96 mg/dl    Triglyceride 178 (H) 29 - 150 mg/dl    LDL, calculated 108 0 - 130 mg/dl    CHOL/HDL Ratio 5.6 (H) 0.0 - 5.0 Ratio   METABOLIC PANEL, COMPREHENSIVE   Result Value Ref Range    Sodium 134 (L) 136 - 145 mEq/L    Potassium 4.5 3.5 - 5.1 mEq/L    Chloride 101 98 - 107 mEq/L    CO2 25 21 - 32 mEq/L    Glucose 131 (H) 74 - 106 mg/dl    BUN 11 7 - 25 mg/dl    Creatinine 0.7 0.6 - 1.3 mg/dl    GFR est AA >60      GFR est non-AA >60      Calcium 9.5 8.5 - 10.1 mg/dl    AST (SGOT) 13 (L) 15 - 37 U/L    ALT (SGPT) 35 12 - 78 U/L    Alk. phosphatase 61 45 - 117 U/L    Bilirubin, total 0.7 0.2 - 1.0 mg/dl    Protein, total 7.5 6.4 - 8.2 gm/dl    Albumin 3.8 3.4 - 5.0 gm/dl         Assessment and Plan  Diagnoses and all orders for this visit:    1. Acute otitis media, unspecified otitis media type    2. Acute non-recurrent pansinusitis    Resolved otitis and pansinusitis. Continue Claritin. After care summary printed and reviewed with patient. Plan reviewed with patient. Questions answered. Patient verbalized understanding of plan and is in agreement with plan. Patient to follow up in one month for a PE or earlier if symptoms worsen.      Félix Horn, MONCHOP-C

## 2018-03-15 NOTE — MR AVS SNAPSHOT
[address] [facility] 
 
 
 [address] [id number] [address] [address] [phone] Patient: Jacinto Cobb MRN:  [id number] Visit Information Date & Time Provider Department Dept. Phone Encounter #  
 3/15/2018  3:00 PM Shashi Aguayo NP [address] [id number] Follow-up Instructions Return in about 1 month (around 4/15/2018), or if symptoms worsen or fail to improve, for Physical.  
  
Upcoming Health Maintenance Date Due  
 EYE EXAM RETINAL OR DILATED Q1 2/9/2016 HEMOGLOBIN A1C Q6M 3/16/2018 MICROALBUMIN Q1 6/17/2018 FOOT EXAM Q1 7/5/2018 FOBT Q 1 YEAR AGE 50-75 7/15/2018 LIPID PANEL Q1 2/24/2019 DTaP/Tdap/Td series (2 - Td) 2/26/2026 Allergies as of 3/15/2018  Review Complete On: 3/1/2018 By: Shashi Aguayo NP Severity Noted Reaction Type Reactions Lisinopril  12/20/2011    Cough Pravastatin  10/24/2013    Itching Current Immunizations  Reviewed on 3/24/2017 Name Date Influenza Vaccine 10/19/2017, 11/4/2013 Pneumococcal Polysaccharide (PPSV-23) 8/26/2016 Tdap 2/26/2016 Not reviewed this visit You Were Diagnosed With   
  
 Codes Comments Acute otitis media, unspecified otitis media type    -  Primary ICD-10-CM: H66.90 ICD-9-CM: 382. 9 Acute non-recurrent pansinusitis     ICD-10-CM: J01.40 ICD-9-CM: 461.8 Vitals BP Pulse Temp Resp Height(growth percentile) Weight(growth percentile) 131/79 (BP 1 Location: Right arm, BP Patient Position: Sitting) 98 97.2 °F (36.2 °C) (Oral) 20 5' 6\" (1.676 m) 177 lb 8 oz (80.5 kg) BMI Smoking Status 28.65 kg/m2 Current Every Day Smoker BMI and BSA Data Body Mass Index Body Surface Area  
 28.65 kg/m 2 1.94 m 2 Preferred Pharmacy Pharmacy Name Phone  [facility] Shayne Galindo [phone] Your Updated Medication List  
  
   
This list is accurate as of 3/15/18  3:25 PM.  Always use your most recent med list.  
  
  
  
  
 albuterol 90 mcg/actuation inhaler Commonly known as:  PROVENTIL HFA, VENTOLIN HFA, PROAIR HFA Take 2 Puffs by inhalation every six (6) hours as needed for Wheezing. aspirin 81 mg chewable tablet Take 81 mg by mouth daily. ergocalciferol 50,000 unit capsule Commonly known as:  ERGOCALCIFEROL  
TAKE 1 CAPSULE BY MOUTH EVERY 7 DAYS  
  
 methocarbamol 500 mg tablet Commonly known as:  ROBAXIN  
TAKE 1 TABLET BY MOUTH EVERY NIGHT AS NEEDED  
  
 omega-3 acid ethyl esters 1 gram capsule Commonly known as:  Donne Violetta Take 2 Caps by mouth two (2) times a day. SITagliptin-metFORMIN 50-1,000 mg per tablet Commonly known as:  JANUMET  
TAKE 1 TABLET BY MOUTH TWICE DAILY WITH MEALS  
  
 tiotropium bromide 1.25 mcg/actuation inhaler Commonly known as:  Garrett Rodriguez INHALE 2 PUFFS BY MOUTH DAILY ZyrTEC 10 mg tablet Generic drug:  cetirizine Take 10 mg by mouth daily. Follow-up Instructions Return in about 1 month (around 4/15/2018), or if symptoms worsen or fail to improve, for Physical.  
  
  
Patient Instructions Please contact our office if you have any questions about your visit today. Introducing [address]! Dear Arthur Nicholas: Thank you for requesting a [employer] account. Our records indicate that you already have an active [employer] account. You can access your account anytime at [email or web address] Did you know that you can access your hospital and ER discharge instructions at any time in [employer]? You can also review all of your test results from your hospital stay or ER visit. Additional Information If you have questions, please visit the Frequently Asked Questions section of the [employer] website at [email or web address] Remember, [employer] is NOT to be used for urgent needs. For medical emergencies, dial 911. Now available from your iPhone and Android! Please provide this summary of care documentation to your next provider. Your primary care clinician is listed as GARDENIA FREED. If you have any questions after today's visit, please call [phone].

## 2018-03-15 NOTE — PROGRESS NOTES
Chief Complaint   Patient presents with    Follow-up     2 week f/u    Pulmonary Fibrosis    COPD    Ear Pain    Diabetes       1. Have you been to the ER, urgent care clinic since your last visit? Hospitalized since your last visit? No    2. Have you seen or consulted any other health care providers outside of the [facility] since your last visit? Include any pap smears or colon screening.  No

## 2018-03-23 ENCOUNTER — TELEPHONE (OUTPATIENT)
Dept: FAMILY MEDICINE CLINIC | Age: 53
End: 2018-03-23

## 2018-03-23 NOTE — TELEPHONE ENCOUNTER
Please contact patient and inform him that his labs have been reviewed with his hemoglobin A1C increasing to 8.0. He needs to make an appointment so we can discuss his treatment plan further. Please decrease carbohydrates, fats, and sugars in his diet. Increase exercise. Your sodium was right at 135 and I would like to repeat this lab as we would like to keep this between 135-145. Any confusion, lethergy, or chest pain please seek emergency assistance. We will discuss his labs further at his visit. Please make an appointment.  Madonna Esteves

## 2018-04-16 ENCOUNTER — OFFICE VISIT (OUTPATIENT)
Dept: FAMILY MEDICINE CLINIC | Age: 53
End: 2018-04-16

## 2018-04-16 VITALS
HEART RATE: 90 BPM | WEIGHT: 177 LBS | RESPIRATION RATE: 16 BRPM | DIASTOLIC BLOOD PRESSURE: 88 MMHG | TEMPERATURE: 98.7 F | OXYGEN SATURATION: 96 % | SYSTOLIC BLOOD PRESSURE: 136 MMHG | BODY MASS INDEX: 28.45 KG/M2 | HEIGHT: 66 IN

## 2018-04-16 DIAGNOSIS — Z00.00 PHYSICAL EXAM: Primary | ICD-10-CM

## 2018-04-16 DIAGNOSIS — E11.9 TYPE 2 DIABETES MELLITUS WITHOUT COMPLICATION, WITHOUT LONG-TERM CURRENT USE OF INSULIN (HCC): ICD-10-CM

## 2018-04-16 NOTE — PATIENT INSTRUCTIONS

## 2018-04-16 NOTE — PROGRESS NOTES
HPI Dennie Commander is a 45 y.o. male  Chief Complaint   Patient presents with    Physical     Patient needs a physical for work patient has brought in form for provider signature     Reports a headache two weeks ago that was associated with nausea and dizziness. Reports his blood sugar was 135 and he took two Excedrin that resolved his headache. Has not had another headache since. Reports he continues to smoke. Denies wheezing but admits to some seasonal allergies. Requesting lab results and physical exam. Reports he does not eat healthy or exercise. Reports he does not take his blood glucose daily. Past Medical History  Past Medical History:   Diagnosis Date    Hyperlipidemia     Type II or unspecified type diabetes mellitus without mention of complication, not stated as uncontrolled     Vitamin D deficiency        Surgical History  Past Surgical History:   Procedure Laterality Date    HX OTHER SURGICAL Right     Right 3rd finger removed        Medications  Current Outpatient Prescriptions   Medication Sig Dispense Refill    tiotropium bromide (SPIRIVA RESPIMAT) 1.25 mcg/actuation inhaler INHALE 2 PUFFS BY MOUTH DAILY 1 Inhaler 1    omega-3 acid ethyl esters (LOVAZA) 1 gram capsule Take 2 Caps by mouth two (2) times a day. 120 Cap 3    ergocalciferol (ERGOCALCIFEROL) 50,000 unit capsule TAKE 1 CAPSULE BY MOUTH EVERY 7 DAYS 12 Cap 6    SITagliptin-metFORMIN (JANUMET) 50-1,000 mg per tablet TAKE 1 TABLET BY MOUTH TWICE DAILY WITH MEALS 60 Tab 6    methocarbamol (ROBAXIN) 500 mg tablet TAKE 1 TABLET BY MOUTH EVERY NIGHT AS NEEDED 30 Tab 2    albuterol (PROVENTIL HFA, VENTOLIN HFA, PROAIR HFA) 90 mcg/actuation inhaler Take 2 Puffs by inhalation every six (6) hours as needed for Wheezing. 1 Inhaler 3    aspirin 81 mg chewable tablet Take 81 mg by mouth daily.  cetirizine (ZYRTEC) 10 mg tablet Take 10 mg by mouth daily.          Allergies  Allergies   Allergen Reactions    Lisinopril Cough    Pravastatin Itching       Family History  Family History   Problem Relation Age of Onset    Cancer Mother      breast ca    Cancer Father        Social History  Social History     Social History    Marital status:      Spouse name: N/A    Number of children: N/A    Years of education: N/A     Occupational History    Not on file. Social History Main Topics    Smoking status: Current Every Day Smoker     Packs/day: 1.50     Years: 30.00     Types: Cigarettes    Smokeless tobacco: Never Used    Alcohol use No      Comment: occasional    Drug use: No    Sexual activity: Not on file     Other Topics Concern    Not on file     Social History Narrative       Problem List  Patient Active Problem List   Diagnosis Code    [facility] E11.9    Hyperlipidemia E78.5    Dental caries K02.9    COPD (chronic obstructive pulmonary disease) ([facility]) J44.9    Vitamin D deficiency E55.9    Onychomycosis B35.1    Colon polyps K63.5       Review of Systems  Review of Systems   Constitutional: Negative for chills and fever. HENT: Negative for sore throat. Respiratory: Positive for cough. Negative for shortness of breath and wheezing. Cardiovascular: Negative for chest pain. Gastrointestinal: Positive for nausea. Negative for abdominal pain, blood in stool and vomiting. Genitourinary: Negative for dysuria and hematuria. Musculoskeletal: Positive for myalgias (occassional). Negative for back pain and neck pain. Neurological: Positive for headaches. Endo/Heme/Allergies: Positive for environmental allergies. Psychiatric/Behavioral: Negative for depression, substance abuse and suicidal ideas.        Vital Signs  Vitals:    04/16/18 1542   BP: 136/88   Pulse: 90   Resp: 16   Temp: 98.7 °F (37.1 °C)   TempSrc: Oral   SpO2: 96%   Weight: 177 lb (80.3 kg)   Height: 5' 6\" (1.676 m)   PainSc:   0 - No pain       Physical Exam  Physical Exam   Constitutional: He is oriented to person, place, and time. HENT:   Mouth/Throat: Oropharynx is clear and moist.   Eyes: Pupils are equal, round, and reactive to light. Neck: Normal range of motion. Cardiovascular: Normal rate, regular rhythm, normal heart sounds and intact distal pulses. No murmur heard. Pulmonary/Chest: Effort normal and breath sounds normal. No respiratory distress. He has no wheezes. Abdominal: Soft. Bowel sounds are normal. He exhibits no distension. Lymphadenopathy:     He has no cervical adenopathy. Neurological: He is alert and oriented to person, place, and time. Skin: Skin is warm and dry. Psychiatric: He has a normal mood and affect. His behavior is normal.   Vitals reviewed. Diagnostics  Orders Placed This Encounter    REFERRAL TO ENDOCRINOLOGY     Referral Priority:   Routine     Referral Type:   Consultation     Referral Reason:   Specialty Services Required     Requested Specialty:   Endocrinology    REFERRAL TO DIABETIC EDUCATION     Referral Priority:   Routine     Referral Type:   Consultation     Referral Reason:   Specialty Services Required       Results  Results for orders placed or performed during the hospital encounter of 03/17/18   HEMOGLOBIN A1C W/O EAG   Result Value Ref Range    Hemoglobin A1c 8.0 (H) 4.8 - 6.0 %   METABOLIC PANEL, COMPREHENSIVE   Result Value Ref Range    Sodium 135 (L) 136 - 145 mEq/L    Potassium 3.8 3.5 - 5.1 mEq/L    Chloride 101 98 - 107 mEq/L    CO2 25 21 - 32 mEq/L    Glucose 173 (H) 74 - 106 mg/dl    BUN 12 7 - 25 mg/dl    Creatinine 0.8 0.6 - 1.3 mg/dl    GFR est AA >60      GFR est non-AA >60      Calcium 9.1 8.5 - 10.1 mg/dl    AST (SGOT) 14 (L) 15 - 37 U/L    ALT (SGPT) 33 12 - 78 U/L    Alk. phosphatase 75 45 - 117 U/L    Bilirubin, total 0.5 0.2 - 1.0 mg/dl    Protein, total 7.5 6.4 - 8.2 gm/dl    Albumin 3.8 3.4 - 5.0 gm/dl         Assessment and Plan  Diagnoses and all orders for this visit:    1. Physical exam    2.  Type 2 diabetes mellitus without complication, without long-term current use of insulin ([facility]  -     REFERRAL TO ENDOCRINOLOGY  -     REFERRAL TO DIABETIC EDUCATION    Lab results reviewed with patient. Stressed the importance of adjusting diet and exercise. Discussed hypoglycemia and hyperglycemia. Discussed risk and consequences of diabetes. After care summary printed and reviewed with patient. Plan reviewed with patient. Questions answered. Patient verbalized understanding of plan and is in agreement with plan. Patient to follow up in one month or earlier if symptoms worsen.     Lord Byrd, HORACIO-C

## 2018-04-16 NOTE — PROGRESS NOTES
Chief Complaint   Patient presents with    Physical     Patient needs a physical for work patient has brought in form for provider signature     1. Have you been to the ER, urgent care clinic since your last visit? Hospitalized since your last visit? No    2. Have you seen or consulted any other health care providers outside of the [address] [place] [address] since your last visit? Include any pap smears or colon screening.  No    Health Maintenance Due   Topic Date Due    EYE EXAM RETINAL OR DILATED Q1  02/09/2016

## 2018-04-16 NOTE — MR AVS SNAPSHOT
[facility] 
 
 
 [address] [place] [address] 
[phone] Patient: Kentrell Bonilla MRN:  [id number] Visit Information Date & Time Provider Department Dept. Phone Encounter #  
 4/16/2018  4:00 PM Ulises Gould NP [address] [id number] Upcoming Health Maintenance Date Due  
 EYE EXAM RETINAL OR DILATED Q1 2/9/2016 MICROALBUMIN Q1 6/17/2018 FOOT EXAM Q1 7/5/2018 FOBT Q 1 YEAR AGE 50-75 7/15/2018 HEMOGLOBIN A1C Q6M 9/17/2018 LIPID PANEL Q1 2/24/2019 DTaP/Tdap/Td series (2 - Td) 6/15/2027 Allergies as of 4/16/2018  Review Complete On: 4/16/2018 By: Ulises Gould NP Severity Noted Reaction Type Reactions Lisinopril  12/20/2011    Cough Pravastatin  10/24/2013    Itching Current Immunizations  Reviewed on 3/24/2017 Name Date Influenza Vaccine 10/19/2017, 11/4/2013 Pneumococcal Polysaccharide (PPSV-23) 8/26/2016 Tdap 6/15/2017 12:00 AM, 2/26/2016 Not reviewed this visit You Were Diagnosed With   
  
 Codes Comments Physical exam    -  Primary ICD-10-CM: Z00.00 ICD-9-CM: V70.9 Type 2 diabetes mellitus without complication, without long-term current use of insulin (HCC)     ICD-10-CM: E11.9 ICD-9-CM: 250.00 Vitals BP Pulse Temp Resp Height(growth percentile) Weight(growth percentile) 136/88 (BP 1 Location: Right arm, BP Patient Position: Sitting) 90 98.7 °F (37.1 °C) (Oral) 16 5' 6\" (1.676 m) 177 lb (80.3 kg) SpO2 BMI Smoking Status 96% 28.57 kg/m2 Current Every Day Smoker BMI and BSA Data Body Mass Index Body Surface Area 28.57 kg/m 2 1.93 m 2 Preferred Pharmacy Pharmacy Name Phone [facility] [address], [facility] [place] [address] [phone] Your Updated Medication List  
  
   
 This list is accurate as of 4/16/18  4:18 PM.  Always use your most recent med list.  
  
  
  
  
 albuterol 90 mcg/actuation inhaler Commonly known as:  PROVENTIL HFA, VENTOLIN HFA, PROAIR HFA Take 2 Puffs by inhalation every six (6) hours as needed for Wheezing. aspirin 81 mg chewable tablet Take 81 mg by mouth daily. ergocalciferol 50,000 unit capsule Commonly known as:  ERGOCALCIFEROL  
TAKE 1 CAPSULE BY MOUTH EVERY 7 DAYS  
  
 methocarbamol 500 mg tablet Commonly known as:  ROBAXIN  
TAKE 1 TABLET BY MOUTH EVERY NIGHT AS NEEDED  
  
 omega-3 acid ethyl esters 1 gram capsule Commonly known as:  Raydell Bane Take 2 Caps by mouth two (2) times a day. SITagliptin-metFORMIN 50-1,000 mg per tablet Commonly known as:  JANUMET  
TAKE 1 TABLET BY MOUTH TWICE DAILY WITH MEALS  
  
 tiotropium bromide 1.25 mcg/actuation inhaler Commonly known as:  Yady Flaming INHALE 2 PUFFS BY MOUTH DAILY ZyrTEC 10 mg tablet Generic drug:  cetirizine Take 10 mg by mouth daily. We Performed the Following REFERRAL TO ENDOCRINOLOGY [BGB63 Custom] Comments:  
 Please evaluate and treat patient for diabetes. Referral Information Referral ID Referred By Referred To  
  
 [id number] Barbara WADSWORTH Not Available Visits Status Start Date End Date 1 New Request 4/16/18 4/16/19 If your referral has a status of pending review or denied, additional information will be sent to support the outcome of this decision. Patient Instructions Learning About Diabetes Food Guidelines Your Care Instructions Meal planning is important to manage diabetes. It helps keep your blood sugar at a target level (which you set with your doctor). You don't have to eat special foods. You can eat what your family eats, including sweets once in a while. But you do have to pay attention to how often you eat and how much you eat of certain foods. You may want to work with a dietitian or a certified diabetes educator (CDE) to help you plan meals and snacks. A dietitian or CDE can also help you lose weight if that is one of your goals. What should you know about eating carbs? Managing the amount of carbohydrate (carbs) you eat is an important part of healthy meals when you have diabetes. Carbohydrate is found in many foods. · Learn which foods have carbs. And learn the amounts of carbs in different foods. ¨ Bread, cereal, pasta, and rice have about 15 grams of carbs in a serving. A serving is 1 slice of bread (1 ounce), ½ cup of cooked cereal, or 1/3 cup of cooked pasta or rice. ¨ Fruits have 15 grams of carbs in a serving. A serving is 1 small fresh fruit, such as an apple or orange; ½ of a banana; ½ cup of cooked or canned fruit; ½ cup of fruit juice; 1 cup of melon or raspberries; or 2 tablespoons of dried fruit. ¨ Milk and no-sugar-added yogurt have 15 grams of carbs in a serving. A serving is 1 cup of milk or 2/3 cup of no-sugar-added yogurt. ¨ Starchy vegetables have 15 grams of carbs in a serving. A serving is ½ cup of mashed potatoes or sweet potato; 1 cup winter squash; ½ of a small baked potato; ½ cup of cooked beans; or ½ cup cooked corn or green peas. · Learn how much carbs to eat each day and at each meal. A dietitian or CDE can teach you how to keep track of the amount of carbs you eat. This is called carbohydrate counting. · If you are not sure how to count carbohydrate grams, use the Plate Method to plan meals. It is a good, quick way to make sure that you have a balanced meal. It also helps you spread carbs throughout the day. ¨ Divide your plate by types of foods. Put non-starchy vegetables on half the plate, meat or other protein food on one-quarter of the plate, and a grain or starchy vegetable in the final quarter of the plate.  To this you can add a small piece of fruit and 1 cup of milk or yogurt, depending on how many carbs you are supposed to eat at a meal. 
· Try to eat about the same amount of carbs at each meal. Do not \"save up\" your daily allowance of carbs to eat at one meal. 
· Proteins have very little or no carbs per serving. Examples of proteins are beef, chicken, turkey, fish, eggs, tofu, cheese, cottage cheese, and peanut butter. A serving size of meat is 3 ounces, which is about the size of a deck of cards. Examples of meat substitute serving sizes (equal to 1 ounce of meat) are 1/4 cup of cottage cheese, 1 egg, 1 tablespoon of peanut butter, and ½ cup of tofu. How can you eat out and still eat healthy? · Learn to estimate the serving sizes of foods that have carbohydrate. If you measure food at home, it will be easier to estimate the amount in a serving of restaurant food. · If the meal you order has too much carbohydrate (such as potatoes, corn, or baked beans), ask to have a low-carbohydrate food instead. Ask for a salad or green vegetables. · If you use insulin, check your blood sugar before and after eating out to help you plan how much to eat in the future. · If you eat more carbohydrate at a meal than you had planned, take a walk or do other exercise. This will help lower your blood sugar. What else should you know? · Limit saturated fat, such as the fat from meat and dairy products. This is a healthy choice because people who have diabetes are at higher risk of heart disease. So choose lean cuts of meat and nonfat or low-fat dairy products. Use olive or canola oil instead of butter or shortening when cooking. · Don't skip meals. Your blood sugar may drop too low if you skip meals and take insulin or certain medicines for diabetes. · Check with your doctor before you drink alcohol. Alcohol can cause your blood sugar to drop too low. Alcohol can also cause a bad reaction if you take certain diabetes medicines. Follow-up care is a key part of your treatment and safety.  Be sure to make and go to all appointments, and call your doctor if you are having problems. It's also a good idea to know your test results and keep a list of the medicines you take. Where can you learn more? Go to http://mary-giulia.info/. Enter B714 in the search box to learn more about \"Learning About Diabetes Food Guidelines. \" Current as of: March 13, 2017 Content Version: 11.4 © 7867-1937 [employer]. Care instructions adapted under license by [employer] (which disclaims liability or warranty for this information). If you have questions about a medical condition or this instruction, always ask your healthcare professional. [address] any warranty or liability for your use of this information. Introducing [facility]! Dear Brunilda Johansen: Thank you for requesting a [employer] account. Our records indicate that you already have an active [employer] account. You can access your account anytime at [email or web address] Did you know that you can access your hospital and ER discharge instructions at any time in [employer]? You can also review all of your test results from your hospital stay or ER visit. Additional Information If you have questions, please visit the Frequently Asked Questions section of the [employer] website at [email or web address] Remember, [employer] is NOT to be used for urgent needs. For medical emergencies, dial 911. Now available from your iPhone and Android! Please provide this summary of care documentation to your next provider. Your primary care clinician is listed as GARDENIA FREED. If you have any questions after today's visit, please call [phone].

## 2018-05-25 DIAGNOSIS — R25.2 MUSCLE CRAMPING: ICD-10-CM

## 2018-05-30 DIAGNOSIS — E11.9 TYPE 2 DIABETES MELLITUS WITHOUT COMPLICATION, WITHOUT LONG-TERM CURRENT USE OF INSULIN (HCC): ICD-10-CM

## 2018-05-31 RX ORDER — METHOCARBAMOL 500 MG/1
TABLET, FILM COATED ORAL
Qty: 30 TAB | Refills: 0 | Status: SHIPPED | OUTPATIENT
Start: 2018-05-31 | End: 2018-08-18 | Stop reason: SDUPTHER

## 2018-06-20 NOTE — TELEPHONE ENCOUNTER
From: Jovanny Gonzales  To: Moncho Cabrera MD  Sent: 6/20/2018 5:29 AM EDT  Subject: Medication Renewal Request    Original authorizing provider: MD Jessica Madera. Sissy Mcgarry would like a refill of the following medications:   SITagliptin-metFORMIN (JANUMET) 50-1,000 mg per tablet Moncho Cabrera MD]    Preferred pharmacy: [address] [place] [address] AT [place] [address]    Comment:

## 2018-08-18 DIAGNOSIS — R25.2 MUSCLE CRAMPING: ICD-10-CM

## 2018-08-18 RX ORDER — METHOCARBAMOL 500 MG/1
TABLET, FILM COATED ORAL
Qty: 30 TAB | Refills: 0 | Status: SHIPPED | OUTPATIENT
Start: 2018-08-18 | End: 2022-01-01

## 2018-08-23 ENCOUNTER — DOCUMENTATION ONLY (OUTPATIENT)
Dept: FAMILY MEDICINE CLINIC | Age: 53
End: 2018-08-23

## 2022-04-06 PROBLEM — J96.01 ACUTE HYPOXEMIC RESPIRATORY FAILURE (HCC): Status: ACTIVE | Noted: 2022-01-01

## 2022-04-06 PROBLEM — J18.9 MULTIFOCAL PNEUMONIA: Status: ACTIVE | Noted: 2022-01-01

## 2022-04-28 PROBLEM — J18.9 PNEUMONIA: Status: ACTIVE | Noted: 2022-01-01

## 2022-04-28 PROBLEM — J44.1 COPD WITH ACUTE EXACERBATION (HCC): Status: ACTIVE | Noted: 2022-01-01

## 2022-04-28 PROBLEM — A41.9 SEPSIS (HCC): Status: ACTIVE | Noted: 2022-01-01

## 2022-04-28 PROBLEM — J96.01 ACUTE RESPIRATORY FAILURE WITH HYPOXIA AND HYPERCAPNIA (HCC): Status: ACTIVE | Noted: 2022-01-01

## 2022-04-28 PROBLEM — J96.02 ACUTE RESPIRATORY FAILURE WITH HYPOXIA AND HYPERCAPNIA (HCC): Status: ACTIVE | Noted: 2022-01-01
